# Patient Record
Sex: FEMALE | Race: WHITE | NOT HISPANIC OR LATINO | Employment: OTHER | URBAN - METROPOLITAN AREA
[De-identification: names, ages, dates, MRNs, and addresses within clinical notes are randomized per-mention and may not be internally consistent; named-entity substitution may affect disease eponyms.]

---

## 2017-03-17 ENCOUNTER — RECORDS - HEALTHEAST (OUTPATIENT)
Dept: LAB | Facility: CLINIC | Age: 77
End: 2017-03-17

## 2017-03-17 LAB
CHOLEST SERPL-MCNC: 134 MG/DL
FASTING STATUS PATIENT QL REPORTED: ABNORMAL
HDLC SERPL-MCNC: 43 MG/DL
LDLC SERPL CALC-MCNC: 73 MG/DL
TRIGL SERPL-MCNC: 88 MG/DL

## 2019-04-04 ENCOUNTER — RECORDS - HEALTHEAST (OUTPATIENT)
Dept: LAB | Facility: CLINIC | Age: 79
End: 2019-04-04

## 2019-04-04 LAB
ALBUMIN SERPL-MCNC: 4 G/DL (ref 3.5–5)
ALP SERPL-CCNC: 68 U/L (ref 45–120)
ALT SERPL W P-5'-P-CCNC: 14 U/L (ref 0–45)
ANION GAP SERPL CALCULATED.3IONS-SCNC: 11 MMOL/L (ref 5–18)
AST SERPL W P-5'-P-CCNC: 17 U/L (ref 0–40)
BILIRUB SERPL-MCNC: 0.3 MG/DL (ref 0–1)
BUN SERPL-MCNC: 26 MG/DL (ref 8–28)
CALCIUM SERPL-MCNC: 10.2 MG/DL (ref 8.5–10.5)
CHLORIDE BLD-SCNC: 105 MMOL/L (ref 98–107)
CO2 SERPL-SCNC: 25 MMOL/L (ref 22–31)
CREAT SERPL-MCNC: 1.24 MG/DL (ref 0.6–1.1)
GFR SERPL CREATININE-BSD FRML MDRD: 42 ML/MIN/1.73M2
GLUCOSE BLD-MCNC: 93 MG/DL (ref 70–125)
POTASSIUM BLD-SCNC: 3.9 MMOL/L (ref 3.5–5)
PROT SERPL-MCNC: 6.5 G/DL (ref 6–8)
SODIUM SERPL-SCNC: 141 MMOL/L (ref 136–145)
TSH SERPL DL<=0.005 MIU/L-ACNC: 1.58 UIU/ML (ref 0.3–5)

## 2019-05-09 ENCOUNTER — RECORDS - HEALTHEAST (OUTPATIENT)
Dept: LAB | Facility: CLINIC | Age: 79
End: 2019-05-09

## 2019-05-09 LAB
ANION GAP SERPL CALCULATED.3IONS-SCNC: 9 MMOL/L (ref 5–18)
BUN SERPL-MCNC: 21 MG/DL (ref 8–28)
CALCIUM SERPL-MCNC: 9.9 MG/DL (ref 8.5–10.5)
CHLORIDE BLD-SCNC: 107 MMOL/L (ref 98–107)
CO2 SERPL-SCNC: 25 MMOL/L (ref 22–31)
CREAT SERPL-MCNC: 0.87 MG/DL (ref 0.6–1.1)
GFR SERPL CREATININE-BSD FRML MDRD: >60 ML/MIN/1.73M2
GLUCOSE BLD-MCNC: 88 MG/DL (ref 70–125)
POTASSIUM BLD-SCNC: 4.2 MMOL/L (ref 3.5–5)
SODIUM SERPL-SCNC: 141 MMOL/L (ref 136–145)

## 2020-09-28 ENCOUNTER — RECORDS - HEALTHEAST (OUTPATIENT)
Dept: LAB | Facility: CLINIC | Age: 80
End: 2020-09-28

## 2020-09-28 LAB
ALBUMIN SERPL-MCNC: 3.8 G/DL (ref 3.5–5)
ALP SERPL-CCNC: 78 U/L (ref 45–120)
ALT SERPL W P-5'-P-CCNC: 17 U/L (ref 0–45)
ANION GAP SERPL CALCULATED.3IONS-SCNC: 13 MMOL/L (ref 5–18)
AST SERPL W P-5'-P-CCNC: 23 U/L (ref 0–40)
BILIRUB SERPL-MCNC: 0.3 MG/DL (ref 0–1)
BUN SERPL-MCNC: 20 MG/DL (ref 8–28)
CALCIUM SERPL-MCNC: 9.3 MG/DL (ref 8.5–10.5)
CHLORIDE BLD-SCNC: 105 MMOL/L (ref 98–107)
CO2 SERPL-SCNC: 18 MMOL/L (ref 22–31)
CREAT SERPL-MCNC: 1.01 MG/DL (ref 0.6–1.1)
GFR SERPL CREATININE-BSD FRML MDRD: 53 ML/MIN/1.73M2
GLUCOSE BLD-MCNC: 94 MG/DL (ref 70–125)
POTASSIUM BLD-SCNC: 4.8 MMOL/L (ref 3.5–5)
PROT SERPL-MCNC: 6.9 G/DL (ref 6–8)
SODIUM SERPL-SCNC: 136 MMOL/L (ref 136–145)

## 2020-11-09 ENCOUNTER — RECORDS - HEALTHEAST (OUTPATIENT)
Dept: LAB | Facility: CLINIC | Age: 80
End: 2020-11-09

## 2020-11-09 LAB
APPEARANCE FLD: CLEAR
COLOR FLD: YELLOW
CRYSTALS SNV MICRO: ABNORMAL
LYMPHOCYTES NFR FLD MANUAL: 31 %
MACROPHAGE % - HISTORICAL: 4 %
MONOCYTE % - HISTORICAL: 30 %
NEUTS BAND NFR FLD MANUAL: 36 %
RBC FLUID - HISTORICAL: ABNORMAL /UL
WBC # FLD AUTO: 542 /UL (ref 0–99)

## 2020-11-12 LAB
BACTERIA SPEC CULT: NO GROWTH
GRAM STAIN RESULT: NORMAL
GRAM STAIN RESULT: NORMAL

## 2021-05-29 ENCOUNTER — RECORDS - HEALTHEAST (OUTPATIENT)
Dept: ADMINISTRATIVE | Facility: CLINIC | Age: 81
End: 2021-05-29

## 2021-05-30 ENCOUNTER — RECORDS - HEALTHEAST (OUTPATIENT)
Dept: ADMINISTRATIVE | Facility: CLINIC | Age: 81
End: 2021-05-30

## 2021-06-16 PROBLEM — K21.9 GASTROESOPHAGEAL REFLUX DISEASE: Status: ACTIVE | Noted: 2021-03-23

## 2021-06-16 PROBLEM — H25.9 AGE-RELATED CATARACT: Status: ACTIVE | Noted: 2021-03-23

## 2021-06-16 PROBLEM — I10 BENIGN ESSENTIAL HYPERTENSION: Status: ACTIVE | Noted: 2021-03-23

## 2021-06-16 PROBLEM — M85.80 OSTEOPENIA: Status: ACTIVE | Noted: 2021-03-23

## 2021-06-16 PROBLEM — K76.9 DISEASE OF LIVER: Status: ACTIVE | Noted: 2021-03-23

## 2021-07-21 ENCOUNTER — RECORDS - HEALTHEAST (OUTPATIENT)
Dept: ADMINISTRATIVE | Facility: CLINIC | Age: 81
End: 2021-07-21

## 2022-02-17 ENCOUNTER — LAB REQUISITION (OUTPATIENT)
Dept: LAB | Facility: CLINIC | Age: 82
End: 2022-02-17
Payer: MEDICARE

## 2022-02-17 DIAGNOSIS — I10 ESSENTIAL (PRIMARY) HYPERTENSION: ICD-10-CM

## 2022-02-17 LAB
ANION GAP SERPL CALCULATED.3IONS-SCNC: 9 MMOL/L (ref 5–18)
BUN SERPL-MCNC: 25 MG/DL (ref 8–28)
CALCIUM SERPL-MCNC: 9.4 MG/DL (ref 8.5–10.5)
CHLORIDE BLD-SCNC: 106 MMOL/L (ref 98–107)
CHOLEST SERPL-MCNC: 182 MG/DL
CO2 SERPL-SCNC: 24 MMOL/L (ref 22–31)
CREAT SERPL-MCNC: 1.08 MG/DL (ref 0.6–1.1)
FASTING STATUS PATIENT QL REPORTED: NORMAL
GFR SERPL CREATININE-BSD FRML MDRD: 51 ML/MIN/1.73M2
GLUCOSE BLD-MCNC: 89 MG/DL (ref 70–125)
HDLC SERPL-MCNC: 56 MG/DL
LDLC SERPL CALC-MCNC: 110 MG/DL
POTASSIUM BLD-SCNC: 3.8 MMOL/L (ref 3.5–5)
SODIUM SERPL-SCNC: 139 MMOL/L (ref 136–145)
TRIGL SERPL-MCNC: 79 MG/DL

## 2022-02-17 PROCEDURE — 80061 LIPID PANEL: CPT | Mod: ORL | Performed by: NURSE PRACTITIONER

## 2022-02-17 PROCEDURE — 80048 BASIC METABOLIC PNL TOTAL CA: CPT | Mod: ORL | Performed by: NURSE PRACTITIONER

## 2023-04-24 ENCOUNTER — LAB REQUISITION (OUTPATIENT)
Dept: LAB | Facility: CLINIC | Age: 83
End: 2023-04-24
Payer: MEDICARE

## 2023-04-24 DIAGNOSIS — I10 ESSENTIAL (PRIMARY) HYPERTENSION: ICD-10-CM

## 2023-04-24 LAB
ANION GAP SERPL CALCULATED.3IONS-SCNC: 14 MMOL/L (ref 7–15)
BUN SERPL-MCNC: 30.8 MG/DL (ref 8–23)
CALCIUM SERPL-MCNC: 9.4 MG/DL (ref 8.8–10.2)
CHLORIDE SERPL-SCNC: 101 MMOL/L (ref 98–107)
CREAT SERPL-MCNC: 1.23 MG/DL (ref 0.51–0.95)
DEPRECATED HCO3 PLAS-SCNC: 22 MMOL/L (ref 22–29)
GFR SERPL CREATININE-BSD FRML MDRD: 43 ML/MIN/1.73M2
GLUCOSE SERPL-MCNC: 75 MG/DL (ref 70–99)
POTASSIUM SERPL-SCNC: 3.9 MMOL/L (ref 3.4–5.3)
SODIUM SERPL-SCNC: 137 MMOL/L (ref 136–145)

## 2023-04-24 PROCEDURE — 80048 BASIC METABOLIC PNL TOTAL CA: CPT | Mod: ORL | Performed by: NURSE PRACTITIONER

## 2023-05-14 ENCOUNTER — HOSPITAL ENCOUNTER (EMERGENCY)
Facility: CLINIC | Age: 83
Discharge: HOME | End: 2023-05-15
Attending: EMERGENCY MEDICINE | Admitting: EMERGENCY MEDICINE
Payer: MEDICARE

## 2023-05-14 DIAGNOSIS — I67.1 ANEURYSM OF CAVERNOUS PORTION OF LEFT INTERNAL CAROTID ARTERY: ICD-10-CM

## 2023-05-14 DIAGNOSIS — H81.12 BENIGN PAROXYSMAL POSITIONAL VERTIGO, LEFT: ICD-10-CM

## 2023-05-14 LAB
ALBUMIN SERPL-MCNC: 3.4 G/DL (ref 3.5–5)
ALP SERPL-CCNC: 69 U/L (ref 45–120)
ALT SERPL W P-5'-P-CCNC: 11 U/L (ref 0–45)
ANION GAP SERPL CALCULATED.3IONS-SCNC: 8 MMOL/L (ref 5–18)
APTT PPP: 34 SECONDS (ref 22–38)
AST SERPL W P-5'-P-CCNC: 18 U/L (ref 0–40)
ATRIAL RATE - MUSE: 73 BPM
BASOPHILS # BLD AUTO: 0 10E3/UL (ref 0–0.2)
BASOPHILS NFR BLD AUTO: 1 %
BILIRUB SERPL-MCNC: 0.3 MG/DL (ref 0–1)
BUN SERPL-MCNC: 20 MG/DL (ref 8–28)
CALCIUM SERPL-MCNC: 8.9 MG/DL (ref 8.5–10.5)
CHLORIDE BLD-SCNC: 106 MMOL/L (ref 98–107)
CO2 SERPL-SCNC: 21 MMOL/L (ref 22–31)
CREAT SERPL-MCNC: 0.97 MG/DL (ref 0.6–1.1)
DIASTOLIC BLOOD PRESSURE - MUSE: NORMAL MMHG
EOSINOPHIL # BLD AUTO: 0.2 10E3/UL (ref 0–0.7)
EOSINOPHIL NFR BLD AUTO: 3 %
ERYTHROCYTE [DISTWIDTH] IN BLOOD BY AUTOMATED COUNT: 14.8 % (ref 10–15)
GFR SERPL CREATININE-BSD FRML MDRD: 58 ML/MIN/1.73M2
GLUCOSE BLD-MCNC: 124 MG/DL (ref 70–125)
HCT VFR BLD AUTO: 29.7 % (ref 35–47)
HGB BLD-MCNC: 9.6 G/DL (ref 11.7–15.7)
IMM GRANULOCYTES # BLD: 0 10E3/UL
IMM GRANULOCYTES NFR BLD: 0 %
INR PPP: 1.01 (ref 0.85–1.15)
INTERPRETATION ECG - MUSE: NORMAL
LYMPHOCYTES # BLD AUTO: 1.6 10E3/UL (ref 0.8–5.3)
LYMPHOCYTES NFR BLD AUTO: 21 %
MAGNESIUM SERPL-MCNC: 1.7 MG/DL (ref 1.8–2.6)
MCH RBC QN AUTO: 27 PG (ref 26.5–33)
MCHC RBC AUTO-ENTMCNC: 32.3 G/DL (ref 31.5–36.5)
MCV RBC AUTO: 84 FL (ref 78–100)
MONOCYTES # BLD AUTO: 0.6 10E3/UL (ref 0–1.3)
MONOCYTES NFR BLD AUTO: 8 %
NEUTROPHILS # BLD AUTO: 4.9 10E3/UL (ref 1.6–8.3)
NEUTROPHILS NFR BLD AUTO: 67 %
NRBC # BLD AUTO: 0 10E3/UL
NRBC BLD AUTO-RTO: 0 /100
P AXIS - MUSE: 28 DEGREES
PLATELET # BLD AUTO: 331 10E3/UL (ref 150–450)
POTASSIUM BLD-SCNC: 3.7 MMOL/L (ref 3.5–5)
PR INTERVAL - MUSE: 214 MS
PROT SERPL-MCNC: 6.2 G/DL (ref 6–8)
QRS DURATION - MUSE: 72 MS
QT - MUSE: 370 MS
QTC - MUSE: 407 MS
R AXIS - MUSE: 22 DEGREES
RBC # BLD AUTO: 3.55 10E6/UL (ref 3.8–5.2)
SODIUM SERPL-SCNC: 135 MMOL/L (ref 136–145)
SYSTOLIC BLOOD PRESSURE - MUSE: NORMAL MMHG
T AXIS - MUSE: 9 DEGREES
TROPONIN I SERPL-MCNC: 0.01 NG/ML (ref 0–0.29)
VENTRICULAR RATE- MUSE: 73 BPM
WBC # BLD AUTO: 7.4 10E3/UL (ref 4–11)

## 2023-05-14 PROCEDURE — 36415 COLL VENOUS BLD VENIPUNCTURE: CPT | Performed by: EMERGENCY MEDICINE

## 2023-05-14 PROCEDURE — 93005 ELECTROCARDIOGRAM TRACING: CPT | Performed by: EMERGENCY MEDICINE

## 2023-05-14 PROCEDURE — 96374 THER/PROPH/DIAG INJ IV PUSH: CPT

## 2023-05-14 PROCEDURE — 84484 ASSAY OF TROPONIN QUANT: CPT | Performed by: EMERGENCY MEDICINE

## 2023-05-14 PROCEDURE — 83735 ASSAY OF MAGNESIUM: CPT | Performed by: EMERGENCY MEDICINE

## 2023-05-14 PROCEDURE — 80053 COMPREHEN METABOLIC PANEL: CPT | Performed by: EMERGENCY MEDICINE

## 2023-05-14 PROCEDURE — 250N000011 HC RX IP 250 OP 636: Performed by: EMERGENCY MEDICINE

## 2023-05-14 PROCEDURE — 85730 THROMBOPLASTIN TIME PARTIAL: CPT | Performed by: EMERGENCY MEDICINE

## 2023-05-14 PROCEDURE — 99285 EMERGENCY DEPT VISIT HI MDM: CPT | Mod: 25

## 2023-05-14 PROCEDURE — 85610 PROTHROMBIN TIME: CPT | Performed by: EMERGENCY MEDICINE

## 2023-05-14 PROCEDURE — 85025 COMPLETE CBC W/AUTO DIFF WBC: CPT | Performed by: EMERGENCY MEDICINE

## 2023-05-14 RX ORDER — MECLIZINE HCL 12.5 MG 12.5 MG/1
12.5 TABLET ORAL ONCE
Status: COMPLETED | OUTPATIENT
Start: 2023-05-14 | End: 2023-05-15

## 2023-05-14 RX ORDER — ONDANSETRON 2 MG/ML
4 INJECTION INTRAMUSCULAR; INTRAVENOUS EVERY 30 MIN PRN
Status: DISCONTINUED | OUTPATIENT
Start: 2023-05-14 | End: 2023-05-15 | Stop reason: HOSPADM

## 2023-05-14 RX ORDER — MAGNESIUM OXIDE 400 MG/1
400 TABLET ORAL ONCE
Status: COMPLETED | OUTPATIENT
Start: 2023-05-15 | End: 2023-05-15

## 2023-05-14 RX ADMIN — ONDANSETRON 4 MG: 2 INJECTION INTRAMUSCULAR; INTRAVENOUS at 23:06

## 2023-05-14 ASSESSMENT — ENCOUNTER SYMPTOMS
NAUSEA: 1
DIZZINESS: 1
EYES NEGATIVE: 1
HEADACHES: 1
PALPITATIONS: 1

## 2023-05-14 ASSESSMENT — ACTIVITIES OF DAILY LIVING (ADL): ADLS_ACUITY_SCORE: 33

## 2023-05-15 ENCOUNTER — APPOINTMENT (OUTPATIENT)
Dept: MRI IMAGING | Facility: CLINIC | Age: 83
End: 2023-05-15
Attending: EMERGENCY MEDICINE
Payer: MEDICARE

## 2023-05-15 VITALS
TEMPERATURE: 97.7 F | HEART RATE: 75 BPM | DIASTOLIC BLOOD PRESSURE: 78 MMHG | BODY MASS INDEX: 24.75 KG/M2 | OXYGEN SATURATION: 98 % | RESPIRATION RATE: 16 BRPM | HEIGHT: 64 IN | WEIGHT: 145 LBS | SYSTOLIC BLOOD PRESSURE: 138 MMHG

## 2023-05-15 PROCEDURE — 70549 MR ANGIOGRAPH NECK W/O&W/DYE: CPT | Mod: MA

## 2023-05-15 PROCEDURE — A9585 GADOBUTROL INJECTION: HCPCS | Performed by: EMERGENCY MEDICINE

## 2023-05-15 PROCEDURE — 255N000002 HC RX 255 OP 636: Performed by: EMERGENCY MEDICINE

## 2023-05-15 PROCEDURE — 70544 MR ANGIOGRAPHY HEAD W/O DYE: CPT | Mod: MA

## 2023-05-15 PROCEDURE — 70553 MRI BRAIN STEM W/O & W/DYE: CPT | Mod: MA

## 2023-05-15 PROCEDURE — 250N000013 HC RX MED GY IP 250 OP 250 PS 637: Performed by: EMERGENCY MEDICINE

## 2023-05-15 RX ORDER — GADOBUTROL 604.72 MG/ML
10 INJECTION INTRAVENOUS ONCE
Status: COMPLETED | OUTPATIENT
Start: 2023-05-15 | End: 2023-05-15

## 2023-05-15 RX ORDER — MECLIZINE HYDROCHLORIDE 25 MG/1
12.5 TABLET ORAL 3 TIMES DAILY PRN
Qty: 12 TABLET | Refills: 0 | Status: SHIPPED | OUTPATIENT
Start: 2023-05-15

## 2023-05-15 RX ADMIN — Medication 400 MG: at 01:49

## 2023-05-15 RX ADMIN — GADOBUTROL 10 ML: 604.72 INJECTION INTRAVENOUS at 00:47

## 2023-05-15 ASSESSMENT — VISUAL ACUITY: OU: 1

## 2023-05-15 ASSESSMENT — ACTIVITIES OF DAILY LIVING (ADL): ADLS_ACUITY_SCORE: 35

## 2023-05-15 NOTE — DISCHARGE INSTRUCTIONS
As discussed in the ER recommend meclizine as needed for the vertigo or spinning feeling if it returns.  Recommend monitoring her blood pressure at home and continuing to take your regular blood pressure medications.  Recommend follow-up with neurosurgery to monitor incidental finding of aneurysm on your MRI.

## 2023-05-15 NOTE — ED PROVIDER NOTES
NAME: Carmen Rowe  AGE: 82 year old female  YOB: 1940  MRN: [id number]  EVALUATION DATE & TIME: No admission date for patient encounter.    PCP: System, Provider Not In    ED PROVIDER: Lorne Carlisle M.D.      Chief Complaint   Patient presents with     Dizziness     FINAL IMPRESSION:  1. 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 Benign paroxysmal positional vertigo, left        MEDICAL DECISION MAKIN:57 PM I met with the patient, obtained history, performed an initial exam, and discussed options and plan for diagnostics and treatment here in the ED.   Patient was clinically assessed and consented to treatment. After assessment, medical decision making and workup were discussed with the patient. The patient was agreeable to plan for testing, workup, and treatment.  Pertinent Labs & Imaging studies reviewed. (See chart for details)       Medical Decision Making    History:    Supplemental history from: Documented in chart, if applicable    External Record(s) reviewed: Documented in chart, if applicable.    Work Up:    Chart documentation includes differential considered and any EKGs or imaging independently interpreted by provider, where specified.    In additional to work up documented, I considered the following work up: Documented in chart, if applicable.    External consultation:    Discussion of management with another provider: Documented in chart, if applicable    Complicating factors:    Care impacted by chronic illness: N/A    Care affected by social determinants of health: N/A    Disposition considerations: Discharge. I prescribed additional prescription strength medication(s) as charted. See documentation for any additional details.    Carmen Rowe is a 82 year old female who presents with vertigo.   Differential diagnosis includes but not limited to benign positional vertigo, central vertigo, cerebellar infarct, TIA, vertebral  dissection.  Patient with intermittent episodes of vertigo since earlier this evening.  Patient's symptoms are gone now and would be concerning for possible TIA though with the positional nature I would feel more likely benign positional vertigo.  Patient otherwise neurologically intact at this time with no acute findings and only notable for elevated blood pressure.  Patient does appear anxious so and will plan for recheck of blood pressure.  Patient was seen in triage due to boarding and will start work-up there.  Labs ordered, EKG, and MRI to evaluate posterior circulation.  Labs showed unremarkable CBC, stable metabolic panel, negative troponin, and EKG was unremarkable for any acute ischemic changes.  Patient's recheck blood pressure in the 140s and will continue monitoring and waiting for MRI.  Patient not without any symptoms but was given some Zofran and meclizine to prevent return of any vertigo with changes in position and getting her into bed once a room was available in the ER.  Patient then sent to MRI and MRI returned showing no signs of acute infarct but did show a incidental finding of a 6 x 7 mm left ICA aneurysm.  This unlikely is causing any of her vertigo as symptoms are now completely gone and most likely these are benign positional vertigo but incidental finding of the aneurysm needs follow-up.  I did speak with her about this and the risk factors for 5-year rupture and concern for blood pressure control.  She does take blood pressure medications and reports her blood pressure has been running a little bit low but her doctor is allowing this as she is asymptomatic from it.  Blood pressures on recheck here were in the 140s under and after discussion of blood pressure control with patient as well as risk factors and recommendations with the aneurysm I will let her discharged to follow-up with her primary doctor for the vertigo as well as neurosurgery for monitoring of the aneurysm.  Patient  "comfortable with plan and ready to go home will be plan for discharge.    0 minutes of critical care time    MEDICATIONS GIVEN IN THE EMERGENCY:  Medications   ondansetron (ZOFRAN) injection 4 mg (4 mg Intravenous $Given 5/14/23 4116)   meclizine (ANTIVERT) tablet 12.5 mg (12.5 mg Oral Not Given 5/15/23 0239)   magnesium oxide (MAG-OX) tablet 400 mg (400 mg Oral $Given 5/15/23 0149)   gadobutrol (GADAVIST) injection 10 mL (10 mLs Intravenous $Given 5/15/23 0047)       NEW PRESCRIPTIONS STARTED AT TODAY'S ER VISIT:  Discharge Medication List as of 5/15/2023  2:12 AM      START taking these medications    Details   meclizine (ANTIVERT) 25 MG tablet Take 0.5 tablets (12.5 mg) by mouth 3 times daily as needed for dizziness or nausea, Disp-12 tablet, R-0, Local Print                =================================================================    HPI    Patient information was obtained from: Patient    Use of : N/A       Carmen Rowe is a 82 year old female with a past medical history of hypertension, liver disease, GERD, and osteopenia, who presents to the ED by private car with a concern of dizziness.    PMHx: hypertension (~last took medication this morning)    Patient reports an onset of dizziness which occurred at 7:00 PM tonight. She notes going into the kitchen and \"couldn't walk\" suddenly. She states she immediately went to lay down and \"everything went crazy\" afterwards. Patient endorses heart palpitations which she described as \"my heart was racing\". She notes it went away. She states she is currently \"ok\", but now endorses mild nausea and headache upon arrival to the ED.     Patient denies fall, head trauma, and vision changes. No other medical concerns are expressed at this time.     REVIEW OF SYSTEMS   Review of Systems   Eyes: Negative.    Cardiovascular: Positive for palpitations (intermittent, occurs after dizziness).   Gastrointestinal: Positive for nausea (mild).   Neurological: " "Positive for dizziness and headaches (mild).   All other systems reviewed and are negative.     PAST MEDICAL HISTORY:  History reviewed. No pertinent past medical history.    PAST SURGICAL HISTORY:  History reviewed. No pertinent surgical history.    CURRENT MEDICATIONS:      Current Facility-Administered Medications:      ondansetron (ZOFRAN) injection 4 mg, 4 mg, Intravenous, Q30 Min PRN, Lorne Carlisle MD, 4 mg at 05/14/23 7504    Current Outpatient Medications:      meclizine (ANTIVERT) 25 MG tablet, Take 0.5 tablets (12.5 mg) by mouth 3 times daily as needed for dizziness or nausea, Disp: 12 tablet, Rfl: 0     aspirin 81 mg chewable tablet, [ASPIRIN 81 MG CHEWABLE TABLET] Chew 81 mg daily., Disp: , Rfl:      lisinopriL-hydrochlorothiazide (PRINZIDE,ZESTORETIC) 10-12.5 mg per tablet, [LISINOPRIL-HYDROCHLOROTHIAZIDE (PRINZIDE,ZESTORETIC) 10-12.5 MG PER TABLET] Take 1 tablet by mouth daily., Disp: , Rfl:     ALLERGIES:  Allergies   Allergen Reactions     Guaifenesin-Codeine Unknown     Other reaction(s): mouth swelling     Hydrocodone-Acetaminophen Unknown     Other reaction(s): nausea and vomiting       FAMILY HISTORY:  History reviewed. No pertinent family history.    SOCIAL HISTORY:   Social History     Socioeconomic History     Marital status:      PHYSICAL EXAM:    Vitals: /78   Pulse 75   Temp 97.7  F (36.5  C) (Temporal)   Resp 16   Ht 1.626 m (5' 4\")   Wt 65.8 kg (145 lb)   SpO2 98%   BMI 24.89 kg/m     Physical Exam  Vitals and nursing note reviewed.   Constitutional:       General: She is not in acute distress.     Appearance: Normal appearance. She is normal weight. She is not ill-appearing or toxic-appearing.   HENT:      Head: Normocephalic and atraumatic.   Eyes:      General: Vision grossly intact.      Extraocular Movements: Extraocular movements intact.      Right eye: Normal extraocular motion and no nystagmus.      Left eye: Normal extraocular motion and no " nystagmus.      Pupils: Pupils are equal, round, and reactive to light.   Cardiovascular:      Rate and Rhythm: Normal rate and regular rhythm.      Heart sounds: Normal heart sounds.   Pulmonary:      Effort: Pulmonary effort is normal. No respiratory distress.      Breath sounds: Normal breath sounds.   Musculoskeletal:         General: No signs of injury.      Cervical back: Normal range of motion and neck supple. No tenderness.   Skin:     General: Skin is warm and dry.      Coloration: Skin is not pale.   Neurological:      General: No focal deficit present.      Mental Status: She is alert and oriented to person, place, and time.      Cranial Nerves: No cranial nerve deficit.      Sensory: No sensory deficit.      Motor: No weakness.      Coordination: Coordination normal.      Gait: Gait normal.   Psychiatric:         Behavior: Behavior normal.           LAB:  All pertinent labs reviewed and interpreted.  Labs Ordered and Resulted from Time of ED Arrival to Time of ED Departure   COMPREHENSIVE METABOLIC PANEL - Abnormal       Result Value    Sodium 135 (*)     Potassium 3.7      Chloride 106      Carbon Dioxide (CO2) 21 (*)     Anion Gap 8      Urea Nitrogen 20      Creatinine 0.97      Calcium 8.9      Glucose 124      Alkaline Phosphatase 69      AST 18      ALT 11      Protein Total 6.2      Albumin 3.4 (*)     Bilirubin Total 0.3      GFR Estimate 58 (*)    MAGNESIUM - Abnormal    Magnesium 1.7 (*)    CBC WITH PLATELETS AND DIFFERENTIAL - Abnormal    WBC Count 7.4      RBC Count 3.55 (*)     Hemoglobin 9.6 (*)     Hematocrit 29.7 (*)     MCV 84      MCH 27.0      MCHC 32.3      RDW 14.8      Platelet Count 331      % Neutrophils 67      % Lymphocytes 21      % Monocytes 8      % Eosinophils 3      % Basophils 1      % Immature Granulocytes 0      NRBCs per 100 WBC 0      Absolute Neutrophils 4.9      Absolute Lymphocytes 1.6      Absolute Monocytes 0.6      Absolute Eosinophils 0.2      Absolute Basophils  0.0      Absolute Immature Granulocytes 0.0      Absolute NRBCs 0.0     INR - Normal    INR 1.01     PARTIAL THROMBOPLASTIN TIME - Normal    aPTT 34     TROPONIN I - Normal    Troponin I 0.01         RADIOLOGY:  MRA Neck (Carotids) wo & w Contrast   Final Result   IMPRESSION:   HEAD MRI:   No acute findings. Mild age-related changes.      HEAD MRA:   1.  7 x 6 mm laterally directed aneurysm off the mid cavernous left ICA.   2.  No branch occlusion, significant flow-limiting stenosis or AVM.      NECK MRA:   No measurable stenosis or dissection.         MRA Brain ([place]) wo Contrast   Final Result   IMPRESSION:   HEAD MRI:   No acute findings. Mild age-related changes.      HEAD MRA:   1.  7 x 6 mm laterally directed aneurysm off the mid cavernous left ICA.   2.  No branch occlusion, significant flow-limiting stenosis or AVM.      NECK MRA:   No measurable stenosis or dissection.         MR Brain w/o & w Contrast   Final Result   IMPRESSION:   HEAD MRI:   No acute findings. Mild age-related changes.      HEAD MRA:   1.  7 x 6 mm laterally directed aneurysm off the mid cavernous left ICA.   2.  No branch occlusion, significant flow-limiting stenosis or AVM.      NECK MRA:   No measurable stenosis or dissection.           EKG:   Performed at: 9:32 PM on May 14, 2023.  Impression: Sinus rhythm with first-degree AV block, no signs of acute ST elevation ischemia, no irregular rhythm or atrial fibrillation.  Rate: 73 bpm  Rhythm: Sinus rhythm with first-degree AV block.  QRS Interval: 72 ms  QTc Interval: 407 ms  Comparison: Comparison prior EKG from March 23, 2021 with no acute morphology changes.  I have independently reviewed and interpreted the EKG(s) documented above.     PROCEDURES:   Procedures     I, Gabe Thompson, am serving as a scribe to document services personally performed by Dr. Lorne Carlisle  based on my observation and the provider's statements to me. I, Lorne Carlisle MD attest that  Gabe Thompson is acting in a scribe capacity, has observed my performance of the services and has documented them in accordance with my direction.    Lorne Carlisle M.D.  Emergency Medicine  [facility] Emergency Department     Lorne Carlisle MD  05/15/23 0329

## 2023-05-15 NOTE — ED TRIAGE NOTES
Here for dizziness that describes as room spinning that started a few hours ago. Also reported feeling some palpitations earlier with no chest pain that have now resolved. Denies shortness of breath.   Mild swelling to legs noted. Passed abbreviated NIHS scale.     Triage Assessment     Row Name 05/14/23 3405       Triage Assessment (Adult)    Airway WDL WDL       Respiratory WDL    Respiratory WDL X;rhythm/pattern    Rhythm/Pattern, Respiratory unlabored;pattern regular;deep       Skin Circulation/Temperature WDL    Skin Circulation/Temperature WDL WDL       Cardiac WDL    Cardiac WDL X  palpitations earlier that have now resolved, denies pain       Peripheral/Neurovascular WDL    Peripheral Neurovascular WDL WDL       Cognitive/Neuro/Behavioral WDL    Cognitive/Neuro/Behavioral WDL X    Level of Consciousness alert    Arousal Level opens eyes spontaneously    Orientation oriented x 4    Speech clear;spontaneous;logical    Mood/Behavior cooperative;calm       [place] Coma Scale    Best Eye Response 4-->(E4) spontaneous    Best Motor Response 6-->(M6) obeys commands    Best Verbal Response 5-->(V5) oriented    [place] Coma Scale Score 15

## 2023-05-17 ENCOUNTER — TELEPHONE (OUTPATIENT)
Dept: NEUROSURGERY | Facility: CLINIC | Age: 83
End: 2023-05-17
Payer: MEDICARE

## 2023-05-17 NOTE — TELEPHONE ENCOUNTER
Health Call Center    Phone Message    May a detailed message be left on voicemail: yes     Reason for Call: Other: Patient is calling requesting to schedule a new patient appointment. She states that the ER provider should have sent over all of the information needed for an appointment. Please review and call patient back to schedule.     Action Taken: Message routed to:  [facility] ([facility]    Travel Screening: Not Applicable

## 2023-06-19 NOTE — TELEPHONE ENCOUNTER
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

## 2023-07-11 ENCOUNTER — PRE VISIT (OUTPATIENT)
Dept: NEUROSURGERY | Facility: CLINIC | Age: 83
End: 2023-07-11

## 2023-07-11 ENCOUNTER — OFFICE VISIT (OUTPATIENT)
Dept: NEUROSURGERY | Facility: CLINIC | Age: 83
End: 2023-07-11
Attending: RADIOLOGY
Payer: MEDICARE

## 2023-07-11 VITALS
HEIGHT: 64 IN | WEIGHT: 145 LBS | SYSTOLIC BLOOD PRESSURE: 160 MMHG | BODY MASS INDEX: 24.75 KG/M2 | HEART RATE: 70 BPM | OXYGEN SATURATION: 95 % | DIASTOLIC BLOOD PRESSURE: 80 MMHG

## 2023-07-11 DIAGNOSIS — I67.1 CEREBRAL ANEURYSM, NONRUPTURED: Primary | ICD-10-CM

## 2023-07-11 PROCEDURE — 99207 PR NO BILLABLE SERVICE THIS VISIT: CPT | Performed by: RADIOLOGY

## 2023-07-11 ASSESSMENT — PAIN SCALES - GENERAL: PAINLEVEL: NO PAIN (0)

## 2023-07-11 NOTE — LETTER
7/11/2023       RE: Carmen Rowe  [address]  [place] [place] [address]     Dear Colleague,    Thank you for referring your patient, Carmen Rowe, to the [facility] [place] [facility]. Please see a copy of my visit note below.    Neuroendovascular surgery note    Ms. Carmen Rowe is a 82-year-old female with no significant past medical history presenting with MRA a 6 x 7 mm left internal carotid artery cavernous segment aneurysm.  This was incidentally discovered when patient presented to ED in May 2023 with episodes of vertigo.  She underwent MRI MRA to rule out posterior circulation stroke.  MRI brain was negative for acute stroke, but the MRA demonstrated left internal carotid artery mid cavernous segment aneurysm measuring 7 x 6 mm.  Patient does not have any family history of subarachnoid hemorrhage or aneurysm, she does not have any personal history of aneurysm or subarachnoid hemorrhage.  She does have history of hypertension that is relatively well controlled on oral medications.  She is a non-smoker, nondrinker.  In clinic today, we discussed the natural history of cavernous segment aneurysms and the low risk of it processes for rupture.  All questions and concerns were addressed.  Patient otherwise reports to be doing well.  Her vertigo has significantly reduced since being started on meclizine.  Neurological examination in the clinic was unremarkable.    Assessment and plan  82-year-old female with an incidentally found left internal carotid artery cavernous segment aneurysm measuring 6 x 7 mm.  This can be monitored with serial imaging in 1 year.  Patient does not have any ocular/ophthalmic symptoms related to this lesion.  Cavernous segment aneurysms also generally have negligible risk of rupture or subarachnoid hemorrhage.    Plan  -MRA head in 1 year  -Return to clinic in 1 year    Case was  discussed with Dr. Stuart.  Approximately 30 minutes were spent during this clinic visit including coordination of care, chart review and documentation.        Attestation signed by Domingo Stuart MD at 7/19/2023 10:48 AM:  I agree with the above note by Dr. Pearson       on  7/11/23        Again, thank you for allowing me to participate in the care of your patient.      Sincerely,    Domingo Stuart MD

## 2023-07-11 NOTE — NURSING NOTE
Chief Complaint   Patient presents with     Consult     Bakari Jonas CMA at 9:13 AM on 7/11/2023.

## 2023-07-11 NOTE — PATIENT INSTRUCTIONS
Follow-up with Dr. Stuart in 1 year with an MRA prior to your appointment.    Stroke & Endovascular RN Care Coordinators:    Jeni Noe, RN, BSN  Linda Hunter, RN, CNRN, SCRN    If you have any questions please contact the RN Care Coordinators at [phone], option 1.     After business hours call the  at [phone] and have the Neuro-Interventional Fellow paged.    Thank you for choosing [facility] for your health care needs.

## 2023-07-11 NOTE — PROGRESS NOTES
Neuroendovascular surgery note    Ms. Carmen Rowe is a 82-year-old female with no significant past medical history presenting with MRA a 6 x 7 mm left internal carotid artery cavernous segment aneurysm.  This was incidentally discovered when patient presented to ED in May 2023 with episodes of vertigo.  She underwent MRI MRA to rule out posterior circulation stroke.  MRI brain was negative for acute stroke, but the MRA demonstrated left internal carotid artery mid cavernous segment aneurysm measuring 7 x 6 mm.  Patient does not have any family history of subarachnoid hemorrhage or aneurysm, she does not have any personal history of aneurysm or subarachnoid hemorrhage.  She does have history of hypertension that is relatively well controlled on oral medications.  She is a non-smoker, nondrinker.  In clinic today, we discussed the natural history of cavernous segment aneurysms and the low risk of it processes for rupture.  All questions and concerns were addressed.  Patient otherwise reports to be doing well.  Her vertigo has significantly reduced since being started on meclizine.  Neurological examination in the clinic was unremarkable.    Assessment and plan  82-year-old female with an incidentally found left internal carotid artery cavernous segment aneurysm measuring 6 x 7 mm.  This can be monitored with serial imaging in 1 year.  Patient does not have any ocular/ophthalmic symptoms related to this lesion.  Cavernous segment aneurysms also generally have negligible risk of rupture or subarachnoid hemorrhage.    Plan  -MRA head in 1 year  -Return to clinic in 1 year    Case was discussed with Dr. Stuart.  Approximately 30 minutes were spent during this clinic visit including coordination of care, chart review and documentation.    Manuel Pearson MD, MPH  Neuroendovascular Surgery Fellow  [facility]  Pager: [phone]

## 2023-10-24 ENCOUNTER — HOSPITAL ENCOUNTER (OUTPATIENT)
Facility: CLINIC | Age: 83
End: 2023-10-24
Attending: SURGERY | Admitting: SURGERY
Payer: MEDICARE

## 2023-10-24 ENCOUNTER — ANESTHESIA EVENT (OUTPATIENT)
Dept: SURGERY | Facility: CLINIC | Age: 83
End: 2023-10-24
Payer: MEDICARE

## 2023-10-24 ENCOUNTER — APPOINTMENT (OUTPATIENT)
Dept: CT IMAGING | Facility: CLINIC | Age: 83
End: 2023-10-24
Attending: EMERGENCY MEDICINE
Payer: MEDICARE

## 2023-10-24 ENCOUNTER — HOSPITAL ENCOUNTER (OUTPATIENT)
Facility: CLINIC | Age: 83
Setting detail: OBSERVATION
Discharge: HOME | End: 2023-10-26
Attending: EMERGENCY MEDICINE
Payer: MEDICARE

## 2023-10-24 DIAGNOSIS — K80.50 BILIARY COLIC: ICD-10-CM

## 2023-10-24 DIAGNOSIS — K80.20 SYMPTOMATIC CHOLELITHIASIS: Primary | ICD-10-CM

## 2023-10-24 PROBLEM — I67.1 BRAIN ANEURYSM: Status: ACTIVE | Noted: 2023-10-24

## 2023-10-24 LAB
ALBUMIN SERPL BCG-MCNC: 4.4 G/DL (ref 3.5–5.2)
ALP SERPL-CCNC: 86 U/L (ref 35–104)
ALT SERPL W P-5'-P-CCNC: 12 U/L (ref 0–50)
ANION GAP SERPL CALCULATED.3IONS-SCNC: 11 MMOL/L (ref 7–15)
AST SERPL W P-5'-P-CCNC: 19 U/L (ref 0–45)
ATRIAL RATE - MUSE: 63 BPM
BASOPHILS # BLD AUTO: 0 10E3/UL (ref 0–0.2)
BASOPHILS NFR BLD AUTO: 0 %
BILIRUB DIRECT SERPL-MCNC: <0.2 MG/DL (ref 0–0.3)
BILIRUB SERPL-MCNC: 0.3 MG/DL
BUN SERPL-MCNC: 22 MG/DL (ref 8–23)
CALCIUM SERPL-MCNC: 9.5 MG/DL (ref 8.8–10.2)
CHLORIDE SERPL-SCNC: 104 MMOL/L (ref 98–107)
CREAT SERPL-MCNC: 0.95 MG/DL (ref 0.51–0.95)
DEPRECATED HCO3 PLAS-SCNC: 23 MMOL/L (ref 22–29)
DIASTOLIC BLOOD PRESSURE - MUSE: 89 MMHG
EGFRCR SERPLBLD CKD-EPI 2021: 59 ML/MIN/1.73M2
EOSINOPHIL # BLD AUTO: 0.1 10E3/UL (ref 0–0.7)
EOSINOPHIL NFR BLD AUTO: 1 %
ERYTHROCYTE [DISTWIDTH] IN BLOOD BY AUTOMATED COUNT: 15.1 % (ref 10–15)
ERYTHROCYTE [SEDIMENTATION RATE] IN BLOOD BY WESTERGREN METHOD: 16 MM/HR (ref 0–30)
GLUCOSE SERPL-MCNC: 122 MG/DL (ref 70–99)
HCT VFR BLD AUTO: 35.1 % (ref 35–47)
HGB BLD-MCNC: 10.9 G/DL (ref 11.7–15.7)
IMM GRANULOCYTES # BLD: 0 10E3/UL
IMM GRANULOCYTES NFR BLD: 0 %
INTERPRETATION ECG - MUSE: NORMAL
LIPASE SERPL-CCNC: 38 U/L (ref 13–60)
LYMPHOCYTES # BLD AUTO: 1.3 10E3/UL (ref 0.8–5.3)
LYMPHOCYTES NFR BLD AUTO: 15 %
MCH RBC QN AUTO: 26.2 PG (ref 26.5–33)
MCHC RBC AUTO-ENTMCNC: 31.1 G/DL (ref 31.5–36.5)
MCV RBC AUTO: 84 FL (ref 78–100)
MONOCYTES # BLD AUTO: 0.4 10E3/UL (ref 0–1.3)
MONOCYTES NFR BLD AUTO: 4 %
NEUTROPHILS # BLD AUTO: 7.1 10E3/UL (ref 1.6–8.3)
NEUTROPHILS NFR BLD AUTO: 80 %
NRBC # BLD AUTO: 0 10E3/UL
NRBC BLD AUTO-RTO: 0 /100
P AXIS - MUSE: 18 DEGREES
PLATELET # BLD AUTO: 368 10E3/UL (ref 150–450)
POTASSIUM SERPL-SCNC: 3.9 MMOL/L (ref 3.4–5.3)
PR INTERVAL - MUSE: 214 MS
PROT SERPL-MCNC: 7.3 G/DL (ref 6.4–8.3)
QRS DURATION - MUSE: 70 MS
QT - MUSE: 386 MS
QTC - MUSE: 395 MS
R AXIS - MUSE: 6 DEGREES
RBC # BLD AUTO: 4.16 10E6/UL (ref 3.8–5.2)
SODIUM SERPL-SCNC: 138 MMOL/L (ref 135–145)
SYSTOLIC BLOOD PRESSURE - MUSE: 142 MMHG
T AXIS - MUSE: 9 DEGREES
TROPONIN T SERPL HS-MCNC: 8 NG/L
VENTRICULAR RATE- MUSE: 63 BPM
WBC # BLD AUTO: 8.9 10E3/UL (ref 4–11)

## 2023-10-24 PROCEDURE — 93005 ELECTROCARDIOGRAM TRACING: CPT | Performed by: EMERGENCY MEDICINE

## 2023-10-24 PROCEDURE — 99204 OFFICE O/P NEW MOD 45 MIN: CPT | Mod: 57 | Performed by: SURGERY

## 2023-10-24 PROCEDURE — 96376 TX/PRO/DX INJ SAME DRUG ADON: CPT | Mod: XU

## 2023-10-24 PROCEDURE — 85048 AUTOMATED LEUKOCYTE COUNT: CPT | Performed by: EMERGENCY MEDICINE

## 2023-10-24 PROCEDURE — 82248 BILIRUBIN DIRECT: CPT | Performed by: EMERGENCY MEDICINE

## 2023-10-24 PROCEDURE — 96375 TX/PRO/DX INJ NEW DRUG ADDON: CPT | Mod: XU

## 2023-10-24 PROCEDURE — G0378 HOSPITAL OBSERVATION PER HR: HCPCS

## 2023-10-24 PROCEDURE — G1010 CDSM STANSON: HCPCS

## 2023-10-24 PROCEDURE — 96375 TX/PRO/DX INJ NEW DRUG ADDON: CPT

## 2023-10-24 PROCEDURE — 74177 CT ABD & PELVIS W/CONTRAST: CPT | Mod: MG

## 2023-10-24 PROCEDURE — 85652 RBC SED RATE AUTOMATED: CPT | Performed by: EMERGENCY MEDICINE

## 2023-10-24 PROCEDURE — 250N000011 HC RX IP 250 OP 636: Performed by: EMERGENCY MEDICINE

## 2023-10-24 PROCEDURE — 84484 ASSAY OF TROPONIN QUANT: CPT | Performed by: EMERGENCY MEDICINE

## 2023-10-24 PROCEDURE — 80053 COMPREHEN METABOLIC PANEL: CPT | Performed by: EMERGENCY MEDICINE

## 2023-10-24 PROCEDURE — 96365 THER/PROPH/DIAG IV INF INIT: CPT | Mod: XU

## 2023-10-24 PROCEDURE — 250N000011 HC RX IP 250 OP 636: Mod: JZ | Performed by: STUDENT IN AN ORGANIZED HEALTH CARE EDUCATION/TRAINING PROGRAM

## 2023-10-24 PROCEDURE — 250N000011 HC RX IP 250 OP 636: Mod: JZ | Performed by: EMERGENCY MEDICINE

## 2023-10-24 PROCEDURE — 83690 ASSAY OF LIPASE: CPT | Performed by: EMERGENCY MEDICINE

## 2023-10-24 PROCEDURE — 96361 HYDRATE IV INFUSION ADD-ON: CPT

## 2023-10-24 PROCEDURE — 99223 1ST HOSP IP/OBS HIGH 75: CPT | Mod: AI | Performed by: STUDENT IN AN ORGANIZED HEALTH CARE EDUCATION/TRAINING PROGRAM

## 2023-10-24 PROCEDURE — 99285 EMERGENCY DEPT VISIT HI MDM: CPT | Mod: 25

## 2023-10-24 PROCEDURE — 258N000003 HC RX IP 258 OP 636: Performed by: EMERGENCY MEDICINE

## 2023-10-24 PROCEDURE — 258N000003 HC RX IP 258 OP 636: Performed by: STUDENT IN AN ORGANIZED HEALTH CARE EDUCATION/TRAINING PROGRAM

## 2023-10-24 PROCEDURE — 250N000013 HC RX MED GY IP 250 OP 250 PS 637: Performed by: STUDENT IN AN ORGANIZED HEALTH CARE EDUCATION/TRAINING PROGRAM

## 2023-10-24 PROCEDURE — 36415 COLL VENOUS BLD VENIPUNCTURE: CPT | Performed by: EMERGENCY MEDICINE

## 2023-10-24 RX ORDER — PIPERACILLIN SODIUM, TAZOBACTAM SODIUM 3; .375 G/15ML; G/15ML
3.38 INJECTION, POWDER, LYOPHILIZED, FOR SOLUTION INTRAVENOUS EVERY 8 HOURS
Status: DISCONTINUED | OUTPATIENT
Start: 2023-10-24 | End: 2023-10-26 | Stop reason: HOSPADM

## 2023-10-24 RX ORDER — SODIUM CHLORIDE 9 MG/ML
INJECTION, SOLUTION INTRAVENOUS CONTINUOUS
Status: DISCONTINUED | OUTPATIENT
Start: 2023-10-24 | End: 2023-10-25

## 2023-10-24 RX ORDER — PIPERACILLIN SODIUM, TAZOBACTAM SODIUM 3; .375 G/15ML; G/15ML
3.38 INJECTION, POWDER, LYOPHILIZED, FOR SOLUTION INTRAVENOUS ONCE
Status: COMPLETED | OUTPATIENT
Start: 2023-10-24 | End: 2023-10-24

## 2023-10-24 RX ORDER — BENZONATATE 100 MG/1
100 CAPSULE ORAL 3 TIMES DAILY PRN
Status: DISCONTINUED | OUTPATIENT
Start: 2023-10-24 | End: 2023-10-24

## 2023-10-24 RX ORDER — PROCHLORPERAZINE 25 MG
12.5 SUPPOSITORY, RECTAL RECTAL EVERY 12 HOURS PRN
Status: DISCONTINUED | OUTPATIENT
Start: 2023-10-24 | End: 2023-10-26 | Stop reason: HOSPADM

## 2023-10-24 RX ORDER — ONDANSETRON 2 MG/ML
4 INJECTION INTRAMUSCULAR; INTRAVENOUS EVERY 30 MIN PRN
Status: DISCONTINUED | OUTPATIENT
Start: 2023-10-24 | End: 2023-10-26 | Stop reason: HOSPADM

## 2023-10-24 RX ORDER — MECLIZINE HCL 12.5 MG 12.5 MG/1
12.5 TABLET ORAL 3 TIMES DAILY PRN
Status: DISCONTINUED | OUTPATIENT
Start: 2023-10-24 | End: 2023-10-26 | Stop reason: HOSPADM

## 2023-10-24 RX ORDER — ONDANSETRON 4 MG/1
4 TABLET, ORALLY DISINTEGRATING ORAL EVERY 6 HOURS PRN
Status: DISCONTINUED | OUTPATIENT
Start: 2023-10-24 | End: 2023-10-26 | Stop reason: HOSPADM

## 2023-10-24 RX ORDER — GLYCOPYRROLATE 1 MG/1
1 TABLET ORAL
COMMUNITY

## 2023-10-24 RX ORDER — HYDRALAZINE HYDROCHLORIDE 10 MG/1
10 TABLET, FILM COATED ORAL 3 TIMES DAILY PRN
Status: DISCONTINUED | OUTPATIENT
Start: 2023-10-24 | End: 2023-10-26 | Stop reason: HOSPADM

## 2023-10-24 RX ORDER — ACETAMINOPHEN 500 MG
500-1000 TABLET ORAL EVERY 6 HOURS PRN
COMMUNITY

## 2023-10-24 RX ORDER — KETOROLAC TROMETHAMINE 15 MG/ML
15 INJECTION, SOLUTION INTRAMUSCULAR; INTRAVENOUS ONCE
Status: COMPLETED | OUTPATIENT
Start: 2023-10-24 | End: 2023-10-24

## 2023-10-24 RX ORDER — ACETAMINOPHEN 325 MG/1
650 TABLET ORAL EVERY 6 HOURS PRN
Status: DISCONTINUED | OUTPATIENT
Start: 2023-10-24 | End: 2023-10-26 | Stop reason: HOSPADM

## 2023-10-24 RX ORDER — ACETAMINOPHEN 650 MG/1
650 SUPPOSITORY RECTAL EVERY 6 HOURS PRN
Status: DISCONTINUED | OUTPATIENT
Start: 2023-10-24 | End: 2023-10-26 | Stop reason: HOSPADM

## 2023-10-24 RX ORDER — ONDANSETRON 2 MG/ML
4 INJECTION INTRAMUSCULAR; INTRAVENOUS EVERY 6 HOURS PRN
Status: DISCONTINUED | OUTPATIENT
Start: 2023-10-24 | End: 2023-10-26 | Stop reason: HOSPADM

## 2023-10-24 RX ORDER — LISINOPRIL 10 MG/1
10 TABLET ORAL DAILY
Status: DISCONTINUED | OUTPATIENT
Start: 2023-10-25 | End: 2023-10-26 | Stop reason: HOSPADM

## 2023-10-24 RX ORDER — LISINOPRIL 10 MG/1
10 TABLET ORAL DAILY
COMMUNITY

## 2023-10-24 RX ORDER — HYDROMORPHONE HYDROCHLORIDE 1 MG/ML
0.2 INJECTION, SOLUTION INTRAMUSCULAR; INTRAVENOUS; SUBCUTANEOUS EVERY 4 HOURS PRN
Status: DISCONTINUED | OUTPATIENT
Start: 2023-10-24 | End: 2023-10-26 | Stop reason: HOSPADM

## 2023-10-24 RX ORDER — IOPAMIDOL 755 MG/ML
71 INJECTION, SOLUTION INTRAVASCULAR ONCE
Status: COMPLETED | OUTPATIENT
Start: 2023-10-24 | End: 2023-10-24

## 2023-10-24 RX ORDER — GLYCOPYRROLATE 1 MG/1
1 TABLET ORAL
Status: DISCONTINUED | OUTPATIENT
Start: 2023-10-24 | End: 2023-10-26 | Stop reason: HOSPADM

## 2023-10-24 RX ORDER — PROCHLORPERAZINE MALEATE 5 MG
5 TABLET ORAL EVERY 6 HOURS PRN
Status: DISCONTINUED | OUTPATIENT
Start: 2023-10-24 | End: 2023-10-26 | Stop reason: HOSPADM

## 2023-10-24 RX ADMIN — ACETAMINOPHEN 650 MG: 325 TABLET ORAL at 16:30

## 2023-10-24 RX ADMIN — PIPERACILLIN AND TAZOBACTAM 3.38 G: 3; .375 INJECTION, POWDER, LYOPHILIZED, FOR SOLUTION INTRAVENOUS at 19:52

## 2023-10-24 RX ADMIN — KETOROLAC TROMETHAMINE 15 MG: 15 INJECTION INTRAMUSCULAR; INTRAVENOUS at 11:32

## 2023-10-24 RX ADMIN — ONDANSETRON 4 MG: 2 INJECTION INTRAMUSCULAR; INTRAVENOUS at 11:44

## 2023-10-24 RX ADMIN — SODIUM CHLORIDE 500 ML: 9 INJECTION, SOLUTION INTRAVENOUS at 14:16

## 2023-10-24 RX ADMIN — PIPERACILLIN AND TAZOBACTAM 3.38 G: 3; .375 INJECTION, POWDER, LYOPHILIZED, FOR SOLUTION INTRAVENOUS at 14:11

## 2023-10-24 RX ADMIN — ACETAMINOPHEN 650 MG: 325 TABLET ORAL at 22:49

## 2023-10-24 RX ADMIN — HYDRALAZINE HYDROCHLORIDE 10 MG: 10 TABLET, FILM COATED ORAL at 16:30

## 2023-10-24 RX ADMIN — IOPAMIDOL 71 ML: 755 INJECTION, SOLUTION INTRAVENOUS at 12:16

## 2023-10-24 RX ADMIN — HYDROMORPHONE HYDROCHLORIDE 0.2 MG: 1 INJECTION, SOLUTION INTRAMUSCULAR; INTRAVENOUS; SUBCUTANEOUS at 19:52

## 2023-10-24 RX ADMIN — HYDROMORPHONE HYDROCHLORIDE 1 MG: 2 TABLET ORAL at 15:37

## 2023-10-24 RX ADMIN — SODIUM CHLORIDE: 9 INJECTION, SOLUTION INTRAVENOUS at 15:37

## 2023-10-24 ASSESSMENT — ACTIVITIES OF DAILY LIVING (ADL)
ADLS_ACUITY_SCORE: 35

## 2023-10-24 NOTE — H&P
[facility]    History and Physical - Hospitalist Service       Date of Admission:  10/24/2023    Assessment & Plan      Carmen Rowe is a 83 year old female admitted on 10/24/2023. She has a past medical history of hypertension, reflux, and known gallstone in the past, who was admitted with plan for cholecystectomy this evening.    On admission is hypertensive 186/77, labs notable for normal BMP, normal LFTs, anemia hemoglobin 10.9, platelets 368, CT chest abdomen pelvis demonstrates distended gallbladder 13 cm with large dependent gallstones but did not note wall thickening or edema, large hiatal hernia noted extensive colonic diverticulosis as well as bilateral adrenal nodules and evidence of prior granulomatous disease, and multiple cystic changes on the liver that is decreased compared to 2006.  General surgery is consulted.  Of note, initially plan was for surgery on 10/25, but per ER team, has been able to be moved up to tonight at 8 PM on 10/24.    Addendum- surgery confirmed for AM; reinstate clear liquid diet, npo at midnight and added on breakthrough-severe pain iv dilaudid low dose up to 3 doses over 12 hours.  Marcos Dickey MD on 10/24/2023 at 7:37 PM    ------    Cholelithiasis with right upper quadrant abdominal pain and nausea and vomiting  Known history of cholelithiasis  Clinically concerning for acute cholecystitis developing  Assessment: As above, patient has had this on and off for multiple years and appears it is patient planning to have intervention.  Currently on admission is hemodynamically vitally stable.  Plan:-Admit inpatient  -Consult to general surgery, patient  -Initial plan is for surgery tomorrow but will be tonight  -Pain control nausea control  -Conservative Tylenol and for breakthrough we will have Dilaudid  -IV fluids overnight    Hypertension  Assessment: It is elevated in setting of pain on admission.  On a regimen of  "lisinopril.  On-call  -Continue lisinopril  -We will have as needed hydralazine in the interim  -Pain control should help as well    Reflux  A/P-appears stable, could start Tums or Protonix if recurrent symptoms     Chronic dizziness  A/p- appears stable, continue meclizine        Diet: NPO per Anesthesia Guidelines for Procedure/Surgery Except for: Meds   DVT Prophylaxis: Pneumatic Compression Devices and Ambulate every shift and will have surgery tonight   De Anda Catheter: Not present  Lines: None     Cardiac Monitoring: None  Code Status: Full Code     Clinically Significant Risk Factors Present on Admission                   # Hypertension: Noted on problem list      # Overweight: Estimated body mass index is 25.69 kg/m  as calculated from the following:    Height as of this encounter: 1.6 m (5' 3\").    Weight as of this encounter: 65.8 kg (145 lb).              Disposition Plan      Expected Discharge Date: 10/25/2023                  Marcos Dickey MD  Hospitalist Service  [facility]  Securely message with [employer] (more info)  Text page via [facility] Paging/Directory     ______________________________________________________________________    Chief Complaint   Abd pain    History is obtained from the patient    History of Present Illness   Carmen Rowe is a 83 year old female who has a past medical history of hypertension, reflux, and known gallstone in the past, who was admitted with plan for cholecystectomy this evening.    On admission, patient confirms history as aforementioned including known gallbladder stone for many years, and it became worse over the past day prompting ER presentation, without other accompanying symptoms.  Discussed plan for surgery evaluation and they are on board for surgery, initially was scheduled for tomorrow morning but appears it might be able to be moved up sooner to tonight.  Confirmed plan with the ER provider as well with the bedside " nurse.    Patient has no headaches or fevers or chills or chest pain or dyspnea or tachypnea or shortness of breath or current nausea or any vomiting, no abdominal pain at rest right now, no dysuria or constipation or diarrhea or neurologic sensory changes.    She is full code, lives with the .    Past Medical History    History reviewed. No pertinent past medical history.    Past Surgical History   History reviewed. No pertinent surgical history.    Prior to Admission Medications   Prior to Admission Medications   Prescriptions Last Dose Informant Patient Reported? Taking?   acetaminophen (TYLENOL) 500 MG tablet PRN  Yes Yes   Sig: Take 500-1,000 mg by mouth every 6 hours as needed for mild pain   glycopyrrolate (ROBINUL) 1 MG tablet PRN  Yes Yes   Sig: Take 1 mg by mouth nightly as needed (Excessive Secretions)   lisinopril (ZESTRIL) 10 MG tablet 10/24/2023 at am  Yes Yes   Sig: Take 10 mg by mouth daily   meclizine (ANTIVERT) 25 MG tablet PRN  No Yes   Sig: Take 0.5 tablets (12.5 mg) by mouth 3 times daily as needed for dizziness or nausea      Facility-Administered Medications: None        Review of Systems    The 10 point Review of Systems is negative other than noted in the HPI or here.      Social History   I have reviewed this patient's social history and updated it with pertinent information if needed.  Social History     Tobacco Use    Smoking status: Never     Passive exposure: Never    Smokeless tobacco: Never   Substance Use Topics    Alcohol use: Never    Drug use: Never     Allergies   Allergies   Allergen Reactions    Guaifenesin-Codeine Unknown     Other reaction(s): mouth swelling    Hydrocodone-Acetaminophen Unknown     Other reaction(s): nausea and vomiting        Physical Exam   Vital Signs: Temp: 97.7  F (36.5  C) Temp src: Oral BP: (!) 184/95 Pulse: 73   Resp: 20 SpO2: 96 % O2 Device: Nasal cannula    Weight: 145 lbs 0 oz      GENERAL:  Alert, appears comfortable, in no acute distress,  appears stated age   HEAD:  Normocephalic, without obvious abnormality, atraumatic   EYES:  PERRL, conjunctiva/corneas clear, no scleral icterus, EOM's intact   NOSE: Nares normal, septum midline, mucosa normal, no drainage   THROAT: Lips, mucosa, and tongue normal; teeth and gums normal, mouth moist   NECK: Supple, symmetrical, trachea midline   BACK:   Symmetric, no curvature    LUNGS:   Clear to auscultation bilaterally, no rales, rhonchi, or wheezing, symmetric chest rise on inhalation, respirations unlabored, on room air    CHEST WALL:  No tenderness or conspicuous deformity   HEART:  Regular rate and rhythm, S1 and S2 normal, no murmur, rub, or gallop, no conspicuous jugular venous distention noted, peripheral pulses intact    ABDOMEN:   Remains soft, tender in the right quadrants including positive Del Iro sign, bowel sounds auscultated in all four quadrants, no masses, no organomegaly, no rebound or guarding   EXTREMITIES: Extremities normal, atraumatic, no cyanosis or edema    SKIN: Dry to touch, no exanthems in the visualized areas   NEURO: Alert, oriented x3, moves all four extremities of their own volition    PSYCH: Cooperative and behavior is appropriate        Medical Decision Making       82 MINUTES SPENT BY ME on the date of service doing chart review, history, exam, documentation & further activities per the note.      Data   ------------------------- PAST 24 HR DATA REVIEWED -----------------------------------------------    I have personally reviewed the following data over the past 24 hrs:    8.9  \   10.9 (L)   / 368     138 104 22.0 /  122 (H)   3.9 23 0.95 \     ALT: 12 AST: 19 AP: 86 TBILI: 0.3   ALB: 4.4 TOT PROTEIN: 7.3 LIPASE: 38     Trop: 8 BNP: N/A       Imaging results reviewed over the past 24 hrs:   Recent Results (from the past 24 hour(s))   CT Chest/Abdomen/Pelvis w Contrast    Narrative    EXAM: CT CHEST/ABDOMEN/PELVIS W CONTRAST  LOCATION: [facility]  HOSPITAL  DATE: 10/24/2023    INDICATION: Awoke with stabbing right upper quadrant pain with nausea.  COMPARISON: CTA CAP 02/23/2021, CT AP 07/19/2006  TECHNIQUE: CT scan of the chest, abdomen, and pelvis was performed following injection of IV contrast. Multiplanar reformats were obtained. Dose reduction techniques were used.   CONTRAST: Isovue 370 71mL    FINDINGS:   LUNGS AND PLEURA: Mild cylindrical bronchiectasis in the lower lobes. No retained secretions. Calcified granuloma in the right upper lobe and small fissural nodule in the right midlung are stable.    MEDIASTINUM/AXILLAE: Large hiatal hernia with a partially intrathoracic stomach again noted. Upper esophagus is mildly distended with fluid. Aorta and branches are normal. No central pulmonary emboli    CORONARY ARTERY CALCIFICATION: Moderate.    HEPATOBILIARY: Gallbladder is distended to 13 cm. There is a large dependent stone within the gallbladder neck. No wall thickening or pericholecystic stranding. Multiple cystic changes are seen within the liver. Largest is a small conglomeration of cysts   superiorly in segment 4A with a few punctate dystrophic calcification within it. These have clearly decreased in size since 2006. No suspicious liver lesions.    PANCREAS: Normal.    SPLEEN: Calcified granulomas in the spleen.    ADRENAL GLANDS: Nodular thickening of both adrenal glands, left greater than right with the left adrenal nodule measuring 1.3 cm.    KIDNEYS/BLADDER: Normal.    BOWEL: Quite redundant colon with diffuse colonic diverticulosis. No inflammatory changes. Bowel is of normal caliber.    LYMPH NODES: There are a few less than 1 cm upper para-aortic nodes, unchanged..    VASCULATURE: Extensive arterial calcifications with ectatic aorta. No aneurysm. Vessels are patent.    PELVIC ORGANS: Normal.    MUSCULOSKELETAL: Moderate facet arthropathy L3-L5. No suspicious bone lesions. No fractures.      Impression    IMPRESSION:  1.  Gallbladder is  distended to 13 cm and has a large dependent gallstone. No wall thickening or pericholecystic fluid. Gallbladder ultrasound can assess this further if there is concern for an early cholecystitis.  2.  Large hiatal hernia with a partially intrathoracic stomach again noted with fluid within the esophagus, presumed due to reflux.  3.  Extensive colonic diverticulosis without evidence of diverticulitis.  4.  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 These have developed since 2006. If there is a clinical need  to further evaluate these, noncontrast adrenal CT can be done in the future.  5.  Evidence of previous granulomatous disease.  6.  Multiple cystic changes in the liver, decreased in size since 2006.  7.  Other noncritical findings as noted above.

## 2023-10-24 NOTE — ED PROVIDER NOTES
"EMERGENCY DEPARTMENT ENCOUNTER            IMPRESSION:  Symptomatic biliary colic        MEDICAL DECISION MAKING:  It was my pleasure to provide care for Carmen Rowe who presented for evaluation of right upper quadrant abdominal pain    On my exam patient is pleasant and cooperative.   Vital signs show hypertension.  Physical exam notable for right upper quadrant tenderness.     Pain medication administered for symptom relief.  Patient's symptoms improved.     EKG independently interpreted myself and does not show ischemia or arrhythmia    Laboratory investigation independently interpreted and notable for normal CBC chemistry and LFTs    CT imaging of the chest and abdomen shows distended gallbladder without evidence of cholecystitis.  Imaging independently interpreted myself and shows cholelithiasis    ED evaluation is consistent with symptomatic biliary colic    Surgery was consulted.  OR not available until later this evening    Broad-spectrum antibiotics administered    I spoke with the admitting hospitalist      Prior to making a final disposition on this patient the results of patient's tests and other diagnostic studies were discussed with the patient. All questions were answered. Patient expressed understanding of the plan and was amenable.     =================================================================  CHIEF COMPLAINT:  Chief Complaint   Patient presents with    Breast Pain         HPI  Carmen Rowe is a 83 year old female with a history of hypertension, liver disease, and GERD who presents to the ED by walk-in for evaluation of RUQ pain.    The patient presents with pain from her epigastrium and wraps around to her RUQ and right flank pain. This woke her up at 2:00 AM this morning with \"severe, stabbing\" aching pain. This pain is worse with deep breaths but not noticeably worse with food. She has felt short of breath with exertion. She has been nauseated and tried taking Tylenol but " "vomited prior to arrival and has miranda vomiting in the waiting room here. She denies any other complaints at this time.     REVIEW OF SYSTEMS  Constitutional: Does not report chills, unintentional weight loss or fatigue   Eyes: Does not report visual changes or discharge    HENT: Does not report sore throat, ear pain or neck pain  Respiratory: Does not report cough. Positive for shortness of breath.  Cardiovascular: Does not report chest pain, palpitations or leg swelling  GI: Does not report  dark, bloody stools.  Positive for RUQ abdominal pain, nausea, vomiting, and right flank pain.  : Does not report hematuria, dysuria, or flank pain  Musculoskeletal: Does not report any new musculoskeletal pain or new muscle/joint pains  Skin: Does not report rash or wound  Neurologic: Does not report current headache, new weakness, focal weakness, or sensory changes        Remainder of systems reviewed, unless noted in HPI all others negative.      PAST MEDICAL HISTORY:  History reviewed. No pertinent past medical history.    PAST SURGICAL HISTORY:  History reviewed. No pertinent surgical history.      CURRENT MEDICATIONS:    aspirin 81 mg chewable tablet  lisinopriL-hydrochlorothiazide (PRINZIDE,ZESTORETIC) 10-12.5 mg per tablet  meclizine (ANTIVERT) 25 MG tablet        ALLERGIES:  Allergies   Allergen Reactions    Guaifenesin-Codeine Unknown     Other reaction(s): mouth swelling    Hydrocodone-Acetaminophen Unknown     Other reaction(s): nausea and vomiting       FAMILY HISTORY:  No family history on file.    SOCIAL HISTORY:   Social History     Socioeconomic History    Marital status:    Tobacco Use    Smoking status: Never     Passive exposure: Never    Smokeless tobacco: Never   Substance and Sexual Activity    Alcohol use: Never    Drug use: Never       PHYSICAL EXAM:    BP (!) 186/77   Pulse 63   Temp 97.7  F (36.5  C) (Oral)   Resp 20   Ht 1.6 m (5' 3\")   Wt 65.8 kg (145 lb)   SpO2 97%   BMI 25.69 kg/m  "       Constitutional: Awake, alert, uncomfortable  Head: Normocephalic, atraumatic.  ENT: Mucous membranes are moist.  No pallor.   Eyes: Pupils are reactive.  No discoloration.  Neck: No lymphadenopathy, no stridor, supple, no soft tissue swelling  Chest: No tenderness   Respiratory: Respirations even, unlabored. Lungs clear to ascultation bilaterally, in no acute respiratory distress.  Cardiovascular: Regular rate and rhythm.  Good overall perfusion.  Upper and lower extremity pulses are equal.  GI: Right upper quadrant tenderness to palpation  Back: No CVA tenderness.    Musculoskeletal: Moves all 4 extremities equally, full function and capacity no peripheral edema.   Integument: Warm, dry. No rash. No bruising or petechiae.  Neurologic: Alert & oriented x 3. Normal speech. Grossly normal motor and sensory function. No focal deficits noted.  NIHSS = 0  Psychiatric: Normal mood and affect.  Appropriate judgement.    ED COURSE:  11:14 AM I met with the patient, obtained history, performed an initial exam, and discussed options and plan for diagnostics and treatment here in the ED.   11:43 AM The patient is still vomiting. Zofran ordered.  1:27 PM I spoke with the surgeon, Dr. Gonzales Smith.         Medical Decision Making    History:  Supplemental history from: NA  External Record(s) reviewed: External medical records including care everywhere reviewed    Work Up:  EKG, laboratory and imaging studies as ordered were independently interpreted by myself.   Broad differential diagnosis considered for abdominal  The patient's presentation was of high complexity.     External consultation:  Discussion of management with another provider: Surgery and admitting hospitalist    Complicating factors:  Patient has a complicated past medical history including   Care affected by social determinants of health: Access to primary care    Disposition involved shared decision-making with the patient.      LAB:  Laboratory results were  independently reviewed and interpreted  Results for orders placed or performed during the hospital encounter of 10/24/23   CT Chest/Abdomen/Pelvis w Contrast    Impression    IMPRESSION:  1.  Gallbladder is distended to 13 cm and has a large dependent gallstone. No wall thickening or pericholecystic fluid. Gallbladder ultrasound can assess this further if there is concern for an early cholecystitis.  2.  Large hiatal hernia with a partially intrathoracic stomach again noted with fluid within the esophagus, presumed due to reflux.  3.  Extensive colonic diverticulosis without evidence of diverticulitis.  4.  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 These have developed since 2006. If there is a clinical need  to further evaluate these, noncontrast adrenal CT can be done in the future.  5.  Evidence of previous granulomatous disease.  6.  Multiple cystic changes in the liver, decreased in size since 2006.  7.  Other noncritical findings as noted above.   Hepatic function panel   Result Value Ref Range    Protein Total 7.3 6.4 - 8.3 g/dL    Albumin 4.4 3.5 - 5.2 g/dL    Bilirubin Total 0.3 <=1.2 mg/dL    Alkaline Phosphatase 86 35 - 104 U/L    AST 19 0 - 45 U/L    ALT 12 0 - 50 U/L    Bilirubin Direct <0.20 0.00 - 0.30 mg/dL   Result Value Ref Range    Lipase 38 13 - 60 U/L   Basic metabolic panel   Result Value Ref Range    Sodium 138 135 - 145 mmol/L    Potassium 3.9 3.4 - 5.3 mmol/L    Chloride 104 98 - 107 mmol/L    Carbon Dioxide (CO2) 23 22 - 29 mmol/L    Anion Gap 11 7 - 15 mmol/L    Urea Nitrogen 22.0 8.0 - 23.0 mg/dL    Creatinine 0.95 0.51 - 0.95 mg/dL    GFR Estimate 59 (L) >60 mL/min/1.73m2    Calcium 9.5 8.8 - 10.2 mg/dL    Glucose 122 (H) 70 - 99 mg/dL   Troponin T, High Sensitivity (now)   Result Value Ref Range    Troponin T, High Sensitivity 8 <=14 ng/L   CBC with platelets and differential   Result Value Ref Range    WBC Count 8.9 4.0 - 11.0 10e3/uL    RBC Count 4.16 3.80 - 5.20  10e6/uL    Hemoglobin 10.9 (L) 11.7 - 15.7 g/dL    Hematocrit 35.1 35.0 - 47.0 %    MCV 84 78 - 100 fL    MCH 26.2 (L) 26.5 - 33.0 pg    MCHC 31.1 (L) 31.5 - 36.5 g/dL    RDW 15.1 (H) 10.0 - 15.0 %    Platelet Count 368 150 - 450 10e3/uL    % Neutrophils 80 %    % Lymphocytes 15 %    % Monocytes 4 %    % Eosinophils 1 %    % Basophils 0 %    % Immature Granulocytes 0 %    NRBCs per 100 WBC 0 <1 /100    Absolute Neutrophils 7.1 1.6 - 8.3 10e3/uL    Absolute Lymphocytes 1.3 0.8 - 5.3 10e3/uL    Absolute Monocytes 0.4 0.0 - 1.3 10e3/uL    Absolute Eosinophils 0.1 0.0 - 0.7 10e3/uL    Absolute Basophils 0.0 0.0 - 0.2 10e3/uL    Absolute Immature Granulocytes 0.0 <=0.4 10e3/uL    Absolute NRBCs 0.0 10e3/uL   Erythrocyte sedimentation rate auto   Result Value Ref Range    Erythrocyte Sedimentation Rate 16 0 - 30 mm/hr   ECG 12-LEAD WITH MUSE ([facility])   Result Value Ref Range    Systolic Blood Pressure 142 mmHg    Diastolic Blood Pressure 89 mmHg    Ventricular Rate 63 BPM    Atrial Rate 63 BPM    CO Interval 214 ms    QRS Duration 70 ms     ms    QTc 395 ms    P Axis 18 degrees    R AXIS 6 degrees    T Axis 9 degrees    Interpretation ECG       Sinus rhythm with 1st degree A-V block  Low voltage QRS  Borderline ECG  When compared with ECG of 14-MAY-2023 21:32,  No significant change was found  Confirmed by SEE ED PROVIDER NOTE FOR, ECG INTERPRETATION ([id number]),  CRIS HANEY ([id number]) on 10/24/2023 9:23:55 AM           RADIOLOGY:  Radiology reports were independently reviewed and interpreted  CT Chest/Abdomen/Pelvis w Contrast   Final Result   IMPRESSION:   1.  Gallbladder is distended to 13 cm and has a large dependent gallstone. No wall thickening or pericholecystic fluid. Gallbladder ultrasound can assess this further if there is concern for an early cholecystitis.   2.  Large hiatal hernia with a partially intrathoracic stomach again noted with fluid within the esophagus, presumed due to reflux.   3.   Extensive colonic diverticulosis without evidence of diverticulitis.   4.  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 These have developed since 2006. If there is a clinical need  to further evaluate these, noncontrast adrenal CT can be done in the future.   5.  Evidence of previous granulomatous disease.   6.  Multiple cystic changes in the liver, decreased in size since 2006.   7.  Other noncritical findings as noted above.           EKG:    ECG results from 10/24/23   ECG 12-LEAD WITH MUSE ([facility])     Value    Systolic Blood Pressure 142    Diastolic Blood Pressure 89    Ventricular Rate 63    Atrial Rate 63    ME Interval 214    QRS Duration 70        QTc 395    P Axis 18    R AXIS 6    T Axis 9    Interpretation ECG      Sinus rhythm with 1st degree A-V block  Low voltage QRS  Borderline ECG  When compared with ECG of 14-MAY-2023 21:32,  No significant change was found  Confirmed by SEE ED PROVIDER NOTE FOR, ECG INTERPRETATION ([id number]),  CRIS HANEY ([id number]) on 10/24/2023 9:23:55 AM         I have independently reviewed and interpreted the EKG(s) documented above.        MEDICATIONS GIVEN IN THE EMERGENCY:  Medications   ondansetron (ZOFRAN) injection 4 mg (4 mg Intravenous $Given 10/24/23 1144)   piperacillin-tazobactam (ZOSYN) 3.375 g vial to attach to  mL bag (3.375 g Intravenous $New Bag 10/24/23 1411)   sodium chloride 0.9% BOLUS 500 mL (500 mLs Intravenous $New Bag 10/24/23 1416)   ketorolac (TORADOL) injection 15 mg (15 mg Intravenous $Given 10/24/23 1132)   iopamidol (ISOVUE-370) solution 71 mL (71 mLs Intravenous $Given 10/24/23 1216)           NEW PRESCRIPTIONS STARTED AT TODAY'S ER VISIT:  New Prescriptions    No medications on file                FINAL DIAGNOSIS:    ICD-10-CM    1. Symptomatic cholelithiasis  K80.20 Case Request: CHOLECYSTECTOMY, LAPAROSCOPIC     Case Request: CHOLECYSTECTOMY, LAPAROSCOPIC      2. Biliary colic  K80.50                  NAME: Carmen  CHERI Rowe  AGE: 83 year old female  YOB: 1940  MRN: [id number]  EVALUATION DATE & TIME: No admission date for patient encounter.    PCP: System, Provider Not In    ED PROVIDER: Bakari Alarcon M.D.      Robbie ALMANZA, am serving as a scribe to document services personally performed by Dr. Bakari Alarcon based on my observation and the provider's statements to me. I, Bakari Alarcon MD attest that Robbie Bangura is acting in a scribe capacity, has observed my performance of the services and has documented them in accordance with my direction.    Bakari Alarcon M.D.  Emergency Medicine  [facility] EMERGENCY ROOM  [facility] [facility] [place] [address]  [phone]  Dept: [phone]  10/24/2023         Bakari Alarcon MD  10/24/23 1425

## 2023-10-24 NOTE — CONSULTS
General Surgery Consultation  Carmen Rowe MRN# [id number]   Age/Sex: 83 year old female YOB: 1940     Reason for consult: 1. Symptomatic cholelithiasis            Requesting physician: Dr. Bakari Alarcon                   Assessment and Plan:   Assessment:  RUQ abdominal pain  Nausea and vomiting  Cholelithiasis   Acute cholecystitis    Ms. Rowe is a 82 yo F admitted with RUQ abdominal pain x12 hours. Afebrile and hypertensive on admission. Labs unremarkable; no leukocytosis, normal LFT's, lipase and troponin. CT chest/abdomen/pelvis obtained and revealed distended gallbladder with a large dependent gallstone in the neck of the gallbladder. Also noted a large hiatal hernia. Discussed pathophysiology of cholecystitis with the patient. Recommend surgical management with laparoscopic cholecystectomy. Patient amendable to this plan.    Plan:  - Okay for clear liquid diet until midnight. NPO at midnight  - IV antibiotics  - Continue supportive cares for pain and nausea  - OR tomorrow morning for laparoscopic cholecystectomy          Chief Complaint:     Chief Complaint   Patient presents with    Breast Pain        History is obtained from the patient    HPI:   Carmen Rowe is a 83 year old female with PMH of HTN who presented to the [facility] Emergency Department with RUQ abdominal pain, nausea and vomiting x12 hours. Patient reports that she was awoken from sleep the evening prior to admission due to severe RUQ abdominal pain. Her pain radiated around her side and to her back. She tried tylenol for her pain with no relief. She reports trying some coffee and a few bites of a breakfast bar and shortly after had nausea and vomiting. She has since had intermittent bilious emesis. Given her persistent symptoms, she presented to the ED for further evaluation. Denies fever, chills, diarrhea, constipation, acholic stools, urinary changes. Currently, still having abdominal pain and nausea. Last  "vomited while in the ED waiting room. Reports that she has had a similar episode of pain before many years ago and was told at that time that she had gallstones and should get her gallbladder removed but deferred at that time. Her past surgical history includes appendectomy.          Past Medical History:   History reviewed. No pertinent past medical history.           Past Surgical History:   History reviewed. No pertinent surgical history.          Social History:    reports that she has never smoked. She has never been exposed to tobacco smoke. She has never used smokeless tobacco. She reports that she does not drink alcohol and does not use drugs.           Family History:   No family history on file.           Allergies:     Allergies   Allergen Reactions    Guaifenesin-Codeine Unknown     Other reaction(s): mouth swelling    Hydrocodone-Acetaminophen Unknown     Other reaction(s): nausea and vomiting              Medications:     Prior to Admission medications    Medication Sig Start Date End Date Taking? Authorizing Provider   aspirin 81 mg chewable tablet [ASPIRIN 81 MG CHEWABLE TABLET] Chew 81 mg daily.  Patient not taking: Reported on 7/11/2023 3/23/21   Provider, Historical   lisinopriL-hydrochlorothiazide (PRINZIDE,ZESTORETIC) 10-12.5 mg per tablet [LISINOPRIL-HYDROCHLOROTHIAZIDE (PRINZIDE,ZESTORETIC) 10-12.5 MG PER TABLET] Take 1 tablet by mouth daily. 3/23/21   Provider, Historical   meclizine (ANTIVERT) 25 MG tablet Take 0.5 tablets (12.5 mg) by mouth 3 times daily as needed for dizziness or nausea 5/15/23   Lorne Carlisle MD              Review of Systems:   The Review of Systems is negative other than noted in the HPI            Physical Exam:   Patient Vitals for the past 24 hrs:   BP Temp Temp src Pulse Resp SpO2 Height Weight   10/24/23 0846 (!) 186/77 97.7  F (36.5  C) Oral 63 20 97 % 1.6 m (5' 3\") 65.8 kg (145 lb)        No intake or output data in the 24 hours ending 10/24/23 1344 "   Constitutional:   awake, alert, cooperative, no apparent distress, and appears stated age       Eyes:   PERRL, conjunctiva/corneas clear, EOM's intact; no scleral edema or icterus noted        ENT:   Normocephalic, without obvious abnormality, atraumatic, Lips, mucosa, and tongue normal          Lungs:   Normal respiratory effort, no accessory muscle use       Cardiovascular:   Regular rate and rhythm       Abdomen:   Soft, tender to palpation over the RUQ. Non-distended. Positive Del Rio's sign.       Musculoskeletal:   No obvious swelling, bruising or deformity       Skin:   Skin color and texture normal for patient, no rashes or lesions              Data:         All imaging studies reviewed by me.    Results for orders placed or performed during the hospital encounter of 10/24/23 (from the past 24 hour(s))   ECG 12-LEAD WITH MUSE (E)   Result Value Ref Range    Systolic Blood Pressure 142 mmHg    Diastolic Blood Pressure 89 mmHg    Ventricular Rate 63 BPM    Atrial Rate 63 BPM    IN Interval 214 ms    QRS Duration 70 ms     ms    QTc 395 ms    P Axis 18 degrees    R AXIS 6 degrees    T Axis 9 degrees    Interpretation ECG       Sinus rhythm with 1st degree A-V block  Low voltage QRS  Borderline ECG  When compared with ECG of 14-MAY-2023 21:32,  No significant change was found  Confirmed by SEE ED PROVIDER NOTE FOR, ECG INTERPRETATION ([id number]),  CRIS HANEY ([id number]) on 10/24/2023 9:23:55 AM     CBC with platelets + differential    Narrative    The following orders were created for panel order CBC with platelets + differential.  Procedure                               Abnormality         Status                     ---------                               -----------         ------                     CBC with platelets and d...[[id number]]  Abnormal            Final result                 Please view results for these tests on the individual orders.   Hepatic function panel   Result Value Ref Range     Protein Total 7.3 6.4 - 8.3 g/dL    Albumin 4.4 3.5 - 5.2 g/dL    Bilirubin Total 0.3 <=1.2 mg/dL    Alkaline Phosphatase 86 35 - 104 U/L    AST 19 0 - 45 U/L    ALT 12 0 - 50 U/L    Bilirubin Direct <0.20 0.00 - 0.30 mg/dL   Lipase   Result Value Ref Range    Lipase 38 13 - 60 U/L   Basic metabolic panel   Result Value Ref Range    Sodium 138 135 - 145 mmol/L    Potassium 3.9 3.4 - 5.3 mmol/L    Chloride 104 98 - 107 mmol/L    Carbon Dioxide (CO2) 23 22 - 29 mmol/L    Anion Gap 11 7 - 15 mmol/L    Urea Nitrogen 22.0 8.0 - 23.0 mg/dL    Creatinine 0.95 0.51 - 0.95 mg/dL    GFR Estimate 59 (L) >60 mL/min/1.73m2    Calcium 9.5 8.8 - 10.2 mg/dL    Glucose 122 (H) 70 - 99 mg/dL   Troponin T, High Sensitivity (now)   Result Value Ref Range    Troponin T, High Sensitivity 8 <=14 ng/L   CBC with platelets and differential   Result Value Ref Range    WBC Count 8.9 4.0 - 11.0 10e3/uL    RBC Count 4.16 3.80 - 5.20 10e6/uL    Hemoglobin 10.9 (L) 11.7 - 15.7 g/dL    Hematocrit 35.1 35.0 - 47.0 %    MCV 84 78 - 100 fL    MCH 26.2 (L) 26.5 - 33.0 pg    MCHC 31.1 (L) 31.5 - 36.5 g/dL    RDW 15.1 (H) 10.0 - 15.0 %    Platelet Count 368 150 - 450 10e3/uL    % Neutrophils 80 %    % Lymphocytes 15 %    % Monocytes 4 %    % Eosinophils 1 %    % Basophils 0 %    % Immature Granulocytes 0 %    NRBCs per 100 WBC 0 <1 /100    Absolute Neutrophils 7.1 1.6 - 8.3 10e3/uL    Absolute Lymphocytes 1.3 0.8 - 5.3 10e3/uL    Absolute Monocytes 0.4 0.0 - 1.3 10e3/uL    Absolute Eosinophils 0.1 0.0 - 0.7 10e3/uL    Absolute Basophils 0.0 0.0 - 0.2 10e3/uL    Absolute Immature Granulocytes 0.0 <=0.4 10e3/uL    Absolute NRBCs 0.0 10e3/uL   CT Chest/Abdomen/Pelvis w Contrast    Narrative    EXAM: CT CHEST/ABDOMEN/PELVIS W CONTRAST  LOCATION: [facility]  DATE: 10/24/2023    INDICATION: Awoke with stabbing right upper quadrant pain with nausea.  COMPARISON: CTA CAP 02/23/2021, CT AP 07/19/2006  TECHNIQUE: CT scan of the chest,  abdomen, and pelvis was performed following injection of IV contrast. Multiplanar reformats were obtained. Dose reduction techniques were used.   CONTRAST: Isovue 370 71mL    FINDINGS:   LUNGS AND PLEURA: Mild cylindrical bronchiectasis in the lower lobes. No retained secretions. Calcified granuloma in the right upper lobe and small fissural nodule in the right midlung are stable.    MEDIASTINUM/AXILLAE: Large hiatal hernia with a partially intrathoracic stomach again noted. Upper esophagus is mildly distended with fluid. Aorta and branches are normal. No central pulmonary emboli    CORONARY ARTERY CALCIFICATION: Moderate.    HEPATOBILIARY: Gallbladder is distended to 13 cm. There is a large dependent stone within the gallbladder neck. No wall thickening or pericholecystic stranding. Multiple cystic changes are seen within the liver. Largest is a small conglomeration of cysts   superiorly in segment 4A with a few punctate dystrophic calcification within it. These have clearly decreased in size since 2006. No suspicious liver lesions.    PANCREAS: Normal.    SPLEEN: Calcified granulomas in the spleen.    ADRENAL GLANDS: Nodular thickening of both adrenal glands, left greater than right with the left adrenal nodule measuring 1.3 cm.    KIDNEYS/BLADDER: Normal.    BOWEL: Quite redundant colon with diffuse colonic diverticulosis. No inflammatory changes. Bowel is of normal caliber.    LYMPH NODES: There are a few less than 1 cm upper para-aortic nodes, unchanged..    VASCULATURE: Extensive arterial calcifications with ectatic aorta. No aneurysm. Vessels are patent.    PELVIC ORGANS: Normal.    MUSCULOSKELETAL: Moderate facet arthropathy L3-L5. No suspicious bone lesions. No fractures.      Impression    IMPRESSION:  1.  Gallbladder is distended to 13 cm and has a large dependent gallstone. No wall thickening or pericholecystic fluid. Gallbladder ultrasound can assess this further if there is concern for an early  cholecystitis.  2.  Large hiatal hernia with a partially intrathoracic stomach again noted with fluid within the esophagus, presumed due to reflux.  3.  Extensive colonic diverticulosis without evidence of diverticulitis.  4.  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 These have developed since 2006. If there is a clinical need  to further evaluate these, noncontrast adrenal CT can be done in the future.  5.  Evidence of previous granulomatous disease.  6.  Multiple cystic changes in the liver, decreased in size since 2006.  7.  Other noncritical findings as noted above.           Mae Brewster PA-C  [facility] General Surgery  [address]  [address] [place] [id number]

## 2023-10-24 NOTE — PHARMACY-ADMISSION MEDICATION HISTORY
Pharmacist Admission Medication History    Admission medication history is complete. The information provided in this note is only as accurate as the sources available at the time of the update.    Information Source(s): Patient, Clinic records, and CareEverywhere/SureScripts via in-person    Pertinent Information: Patient was able to confirm current medications and last known administration times.    Changes made to PTA medication list:  Added: Glycopyrrolate, tylenol, and lisinopril   Deleted: Lisinopril + HCTZ, and Aspirin  Changed: None    Medication Affordability:       Allergies reviewed with patient and updates made in EHR: yes    Medication History Completed By: Srinivas Burgos [facility] 10/24/2023 2:58 PM    PTA Med List   Medication Sig Last Dose    acetaminophen (TYLENOL) 500 MG tablet Take 500-1,000 mg by mouth every 6 hours as needed for mild pain PRN    glycopyrrolate (ROBINUL) 1 MG tablet Take 1 mg by mouth nightly as needed (Excessive Secretions) PRN    lisinopril (ZESTRIL) 10 MG tablet Take 10 mg by mouth daily 10/24/2023 at am    meclizine (ANTIVERT) 25 MG tablet Take 0.5 tablets (12.5 mg) by mouth 3 times daily as needed for dizziness or nausea PRN

## 2023-10-24 NOTE — ED TRIAGE NOTES
Triage Assessment (Adult)       Row Name 10/24/23 0867          Triage Assessment    Airway WDL WDL        Respiratory WDL    Respiratory WDL X  sob especially with exertion        Skin Circulation/Temperature WDL    Skin Circulation/Temperature WDL WDL        Cardiac WDL    Cardiac WDL X;chest pain     Cardiac Rhythm NSR

## 2023-10-24 NOTE — ED NOTES
Clarified with Dr Alarcon, pt to have surgery this evening at 2000. Dr Dickey also said pt to have surgery this evening.

## 2023-10-24 NOTE — ED NOTES
Pt requesting pain med. PA from surgery informed. Will look at chart and decide if she can order something for pt.

## 2023-10-24 NOTE — ED TRIAGE NOTES
Has slight aching in area under right breast yesterday. Awoke at 0200 with severe stabbing aching pain radiating into back. Feels short of breath especially with exertion. Nausea and tried to take tylenol and vomited it back up     Triage Assessment (Adult)       Row Name 10/24/23 0850          Triage Assessment    Airway WDL WDL        Respiratory WDL    Respiratory WDL X  sob especially with exertion        Skin Circulation/Temperature WDL    Skin Circulation/Temperature WDL WDL        Cardiac WDL    Cardiac WDL X;chest pain

## 2023-10-25 ENCOUNTER — ANESTHESIA (OUTPATIENT)
Dept: SURGERY | Facility: CLINIC | Age: 83
End: 2023-10-25
Payer: MEDICARE

## 2023-10-25 ENCOUNTER — APPOINTMENT (OUTPATIENT)
Dept: RADIOLOGY | Facility: CLINIC | Age: 83
End: 2023-10-25
Attending: STUDENT IN AN ORGANIZED HEALTH CARE EDUCATION/TRAINING PROGRAM
Payer: MEDICARE

## 2023-10-25 LAB
ALBUMIN SERPL BCG-MCNC: 3.6 G/DL (ref 3.5–5.2)
ALP SERPL-CCNC: 80 U/L (ref 35–104)
ALT SERPL W P-5'-P-CCNC: 49 U/L (ref 0–50)
ANION GAP SERPL CALCULATED.3IONS-SCNC: 9 MMOL/L (ref 7–15)
AST SERPL W P-5'-P-CCNC: 107 U/L (ref 0–45)
BILIRUB DIRECT SERPL-MCNC: <0.2 MG/DL (ref 0–0.3)
BILIRUB SERPL-MCNC: 0.6 MG/DL
BUN SERPL-MCNC: 20.1 MG/DL (ref 8–23)
CALCIUM SERPL-MCNC: 8.5 MG/DL (ref 8.8–10.2)
CHLORIDE SERPL-SCNC: 109 MMOL/L (ref 98–107)
CREAT SERPL-MCNC: 0.97 MG/DL (ref 0.51–0.95)
DEPRECATED HCO3 PLAS-SCNC: 20 MMOL/L (ref 22–29)
EGFRCR SERPLBLD CKD-EPI 2021: 57 ML/MIN/1.73M2
ERYTHROCYTE [DISTWIDTH] IN BLOOD BY AUTOMATED COUNT: 15.4 % (ref 10–15)
GLUCOSE SERPL-MCNC: 116 MG/DL (ref 70–99)
HCT VFR BLD AUTO: 31.2 % (ref 35–47)
HGB BLD-MCNC: 9.7 G/DL (ref 11.7–15.7)
MCH RBC QN AUTO: 26 PG (ref 26.5–33)
MCHC RBC AUTO-ENTMCNC: 31.1 G/DL (ref 31.5–36.5)
MCV RBC AUTO: 84 FL (ref 78–100)
NT-PROBNP SERPL-MCNC: 681 PG/ML (ref 0–1800)
PLATELET # BLD AUTO: 311 10E3/UL (ref 150–450)
PLATELET # BLD AUTO: 323 10E3/UL (ref 150–450)
POTASSIUM SERPL-SCNC: 4.1 MMOL/L (ref 3.4–5.3)
PROCALCITONIN SERPL IA-MCNC: 0.11 NG/ML
PROT SERPL-MCNC: 6 G/DL (ref 6.4–8.3)
RBC # BLD AUTO: 3.73 10E6/UL (ref 3.8–5.2)
SARS-COV-2 RNA RESP QL NAA+PROBE: NEGATIVE
SODIUM SERPL-SCNC: 138 MMOL/L (ref 135–145)
WBC # BLD AUTO: 12.2 10E3/UL (ref 4–11)

## 2023-10-25 PROCEDURE — 250N000009 HC RX 250: Performed by: NURSE ANESTHETIST, CERTIFIED REGISTERED

## 2023-10-25 PROCEDURE — 250N000011 HC RX IP 250 OP 636: Mod: JZ | Performed by: EMERGENCY MEDICINE

## 2023-10-25 PROCEDURE — 999N000141 HC STATISTIC PRE-PROCEDURE NURSING ASSESSMENT: Performed by: SURGERY

## 2023-10-25 PROCEDURE — 99233 SBSQ HOSP IP/OBS HIGH 50: CPT | Performed by: STUDENT IN AN ORGANIZED HEALTH CARE EDUCATION/TRAINING PROGRAM

## 2023-10-25 PROCEDURE — 250N000013 HC RX MED GY IP 250 OP 250 PS 637

## 2023-10-25 PROCEDURE — 71045 X-RAY EXAM CHEST 1 VIEW: CPT

## 2023-10-25 PROCEDURE — 85027 COMPLETE CBC AUTOMATED: CPT | Performed by: STUDENT IN AN ORGANIZED HEALTH CARE EDUCATION/TRAINING PROGRAM

## 2023-10-25 PROCEDURE — 96361 HYDRATE IV INFUSION ADD-ON: CPT | Mod: XU

## 2023-10-25 PROCEDURE — 87635 SARS-COV-2 COVID-19 AMP PRB: CPT | Performed by: STUDENT IN AN ORGANIZED HEALTH CARE EDUCATION/TRAINING PROGRAM

## 2023-10-25 PROCEDURE — 83880 ASSAY OF NATRIURETIC PEPTIDE: CPT | Performed by: STUDENT IN AN ORGANIZED HEALTH CARE EDUCATION/TRAINING PROGRAM

## 2023-10-25 PROCEDURE — 272N000001 HC OR GENERAL SUPPLY STERILE: Performed by: SURGERY

## 2023-10-25 PROCEDURE — 250N000013 HC RX MED GY IP 250 OP 250 PS 637: Performed by: SURGERY

## 2023-10-25 PROCEDURE — 47562 LAPAROSCOPIC CHOLECYSTECTOMY: CPT | Performed by: SURGERY

## 2023-10-25 PROCEDURE — 80053 COMPREHEN METABOLIC PANEL: CPT | Performed by: STUDENT IN AN ORGANIZED HEALTH CARE EDUCATION/TRAINING PROGRAM

## 2023-10-25 PROCEDURE — 258N000003 HC RX IP 258 OP 636: Performed by: STUDENT IN AN ORGANIZED HEALTH CARE EDUCATION/TRAINING PROGRAM

## 2023-10-25 PROCEDURE — 258N000003 HC RX IP 258 OP 636: Performed by: NURSE ANESTHETIST, CERTIFIED REGISTERED

## 2023-10-25 PROCEDURE — G0378 HOSPITAL OBSERVATION PER HR: HCPCS

## 2023-10-25 PROCEDURE — 88304 TISSUE EXAM BY PATHOLOGIST: CPT | Mod: TC | Performed by: SURGERY

## 2023-10-25 PROCEDURE — 360N000076 HC SURGERY LEVEL 3, PER MIN: Performed by: SURGERY

## 2023-10-25 PROCEDURE — 710N000010 HC RECOVERY PHASE 1, LEVEL 2, PER MIN: Performed by: SURGERY

## 2023-10-25 PROCEDURE — 36415 COLL VENOUS BLD VENIPUNCTURE: CPT | Performed by: SURGERY

## 2023-10-25 PROCEDURE — 250N000009 HC RX 250: Performed by: STUDENT IN AN ORGANIZED HEALTH CARE EDUCATION/TRAINING PROGRAM

## 2023-10-25 PROCEDURE — 370N000017 HC ANESTHESIA TECHNICAL FEE, PER MIN: Performed by: SURGERY

## 2023-10-25 PROCEDURE — 250N000009 HC RX 250: Performed by: SURGERY

## 2023-10-25 PROCEDURE — 250N000011 HC RX IP 250 OP 636: Mod: JZ | Performed by: SURGERY

## 2023-10-25 PROCEDURE — 258N000003 HC RX IP 258 OP 636: Performed by: SURGERY

## 2023-10-25 PROCEDURE — 250N000025 HC SEVOFLURANE, PER MIN: Performed by: SURGERY

## 2023-10-25 PROCEDURE — 250N000011 HC RX IP 250 OP 636: Performed by: SURGERY

## 2023-10-25 PROCEDURE — 36415 COLL VENOUS BLD VENIPUNCTURE: CPT | Performed by: STUDENT IN AN ORGANIZED HEALTH CARE EDUCATION/TRAINING PROGRAM

## 2023-10-25 PROCEDURE — 250N000013 HC RX MED GY IP 250 OP 250 PS 637: Performed by: STUDENT IN AN ORGANIZED HEALTH CARE EDUCATION/TRAINING PROGRAM

## 2023-10-25 PROCEDURE — 96376 TX/PRO/DX INJ SAME DRUG ADON: CPT | Mod: XU

## 2023-10-25 PROCEDURE — 250N000011 HC RX IP 250 OP 636: Performed by: NURSE ANESTHETIST, CERTIFIED REGISTERED

## 2023-10-25 PROCEDURE — 85049 AUTOMATED PLATELET COUNT: CPT | Mod: 91 | Performed by: SURGERY

## 2023-10-25 PROCEDURE — 84145 PROCALCITONIN (PCT): CPT | Performed by: STUDENT IN AN ORGANIZED HEALTH CARE EDUCATION/TRAINING PROGRAM

## 2023-10-25 PROCEDURE — 250N000011 HC RX IP 250 OP 636: Mod: JZ | Performed by: STUDENT IN AN ORGANIZED HEALTH CARE EDUCATION/TRAINING PROGRAM

## 2023-10-25 PROCEDURE — 96375 TX/PRO/DX INJ NEW DRUG ADDON: CPT | Mod: XU

## 2023-10-25 PROCEDURE — 82248 BILIRUBIN DIRECT: CPT | Performed by: STUDENT IN AN ORGANIZED HEALTH CARE EDUCATION/TRAINING PROGRAM

## 2023-10-25 RX ORDER — NALOXONE HYDROCHLORIDE 0.4 MG/ML
0.2 INJECTION, SOLUTION INTRAMUSCULAR; INTRAVENOUS; SUBCUTANEOUS
Status: DISCONTINUED | OUTPATIENT
Start: 2023-10-25 | End: 2023-10-26 | Stop reason: HOSPADM

## 2023-10-25 RX ORDER — LABETALOL HYDROCHLORIDE 5 MG/ML
10 INJECTION, SOLUTION INTRAVENOUS
Status: DISCONTINUED | OUTPATIENT
Start: 2023-10-25 | End: 2023-10-25 | Stop reason: HOSPADM

## 2023-10-25 RX ORDER — FENTANYL CITRATE 50 UG/ML
25 INJECTION, SOLUTION INTRAMUSCULAR; INTRAVENOUS EVERY 5 MIN PRN
Status: DISCONTINUED | OUTPATIENT
Start: 2023-10-25 | End: 2023-10-25 | Stop reason: HOSPADM

## 2023-10-25 RX ORDER — HEPARIN SODIUM 5000 [USP'U]/.5ML
5000 INJECTION, SOLUTION INTRAVENOUS; SUBCUTANEOUS EVERY 8 HOURS
Status: DISCONTINUED | OUTPATIENT
Start: 2023-10-26 | End: 2023-10-26 | Stop reason: HOSPADM

## 2023-10-25 RX ORDER — AMOXICILLIN 250 MG
1 CAPSULE ORAL 2 TIMES DAILY
Status: DISCONTINUED | OUTPATIENT
Start: 2023-10-25 | End: 2023-10-26 | Stop reason: HOSPADM

## 2023-10-25 RX ORDER — LIDOCAINE 40 MG/G
CREAM TOPICAL
Status: DISCONTINUED | OUTPATIENT
Start: 2023-10-25 | End: 2023-10-26 | Stop reason: HOSPADM

## 2023-10-25 RX ORDER — ONDANSETRON 2 MG/ML
4 INJECTION INTRAMUSCULAR; INTRAVENOUS EVERY 30 MIN PRN
Status: DISCONTINUED | OUTPATIENT
Start: 2023-10-25 | End: 2023-10-25 | Stop reason: HOSPADM

## 2023-10-25 RX ORDER — PROPOFOL 10 MG/ML
INJECTION, EMULSION INTRAVENOUS PRN
Status: DISCONTINUED | OUTPATIENT
Start: 2023-10-25 | End: 2023-10-25

## 2023-10-25 RX ORDER — SODIUM CHLORIDE, SODIUM LACTATE, POTASSIUM CHLORIDE, CALCIUM CHLORIDE 600; 310; 30; 20 MG/100ML; MG/100ML; MG/100ML; MG/100ML
INJECTION, SOLUTION INTRAVENOUS CONTINUOUS
Status: DISCONTINUED | OUTPATIENT
Start: 2023-10-25 | End: 2023-10-25 | Stop reason: HOSPADM

## 2023-10-25 RX ORDER — OXYCODONE HYDROCHLORIDE 5 MG/1
5 TABLET ORAL EVERY 4 HOURS PRN
Status: DISCONTINUED | OUTPATIENT
Start: 2023-10-25 | End: 2023-10-26 | Stop reason: HOSPADM

## 2023-10-25 RX ORDER — BISACODYL 10 MG
10 SUPPOSITORY, RECTAL RECTAL DAILY PRN
Status: DISCONTINUED | OUTPATIENT
Start: 2023-10-25 | End: 2023-10-26 | Stop reason: HOSPADM

## 2023-10-25 RX ORDER — DEXAMETHASONE SODIUM PHOSPHATE 10 MG/ML
INJECTION, SOLUTION INTRAMUSCULAR; INTRAVENOUS PRN
Status: DISCONTINUED | OUTPATIENT
Start: 2023-10-25 | End: 2023-10-25

## 2023-10-25 RX ORDER — ONDANSETRON 4 MG/1
4 TABLET, ORALLY DISINTEGRATING ORAL EVERY 30 MIN PRN
Status: CANCELLED | OUTPATIENT
Start: 2023-10-25

## 2023-10-25 RX ORDER — FENTANYL CITRATE 50 UG/ML
50 INJECTION, SOLUTION INTRAMUSCULAR; INTRAVENOUS EVERY 5 MIN PRN
Status: DISCONTINUED | OUTPATIENT
Start: 2023-10-25 | End: 2023-10-25 | Stop reason: HOSPADM

## 2023-10-25 RX ORDER — CALCIUM CARBONATE 500 MG/1
500-1000 TABLET, CHEWABLE ORAL DAILY PRN
Status: DISCONTINUED | OUTPATIENT
Start: 2023-10-25 | End: 2023-10-25 | Stop reason: HOSPADM

## 2023-10-25 RX ORDER — LIDOCAINE HYDROCHLORIDE 10 MG/ML
INJECTION, SOLUTION INFILTRATION; PERINEURAL PRN
Status: DISCONTINUED | OUTPATIENT
Start: 2023-10-25 | End: 2023-10-25

## 2023-10-25 RX ORDER — CEFAZOLIN SODIUM/WATER 2 G/20 ML
2 SYRINGE (ML) INTRAVENOUS
Status: COMPLETED | OUTPATIENT
Start: 2023-10-25 | End: 2023-10-25

## 2023-10-25 RX ORDER — LIDOCAINE HYDROCHLORIDE AND EPINEPHRINE 10; 10 MG/ML; UG/ML
INJECTION, SOLUTION INFILTRATION; PERINEURAL PRN
Status: DISCONTINUED | OUTPATIENT
Start: 2023-10-25 | End: 2023-10-25 | Stop reason: HOSPADM

## 2023-10-25 RX ORDER — SODIUM CHLORIDE, SODIUM LACTATE, POTASSIUM CHLORIDE, CALCIUM CHLORIDE 600; 310; 30; 20 MG/100ML; MG/100ML; MG/100ML; MG/100ML
INJECTION, SOLUTION INTRAVENOUS CONTINUOUS
Status: DISCONTINUED | OUTPATIENT
Start: 2023-10-25 | End: 2023-10-25

## 2023-10-25 RX ORDER — ONDANSETRON 4 MG/1
4 TABLET, ORALLY DISINTEGRATING ORAL EVERY 6 HOURS PRN
Status: DISCONTINUED | OUTPATIENT
Start: 2023-10-25 | End: 2023-10-26 | Stop reason: HOSPADM

## 2023-10-25 RX ORDER — SODIUM CHLORIDE, SODIUM LACTATE, POTASSIUM CHLORIDE, CALCIUM CHLORIDE 600; 310; 30; 20 MG/100ML; MG/100ML; MG/100ML; MG/100ML
INJECTION, SOLUTION INTRAVENOUS CONTINUOUS PRN
Status: DISCONTINUED | OUTPATIENT
Start: 2023-10-25 | End: 2023-10-25

## 2023-10-25 RX ORDER — SODIUM CHLORIDE, SODIUM LACTATE, POTASSIUM CHLORIDE, AND CALCIUM CHLORIDE .6; .31; .03; .02 G/100ML; G/100ML; G/100ML; G/100ML
IRRIGANT IRRIGATION PRN
Status: DISCONTINUED | OUTPATIENT
Start: 2023-10-25 | End: 2023-10-25 | Stop reason: HOSPADM

## 2023-10-25 RX ORDER — ACETAMINOPHEN 325 MG/1
975 TABLET ORAL EVERY 8 HOURS
Qty: 27 TABLET | Refills: 0 | Status: DISCONTINUED | OUTPATIENT
Start: 2023-10-25 | End: 2023-10-26 | Stop reason: HOSPADM

## 2023-10-25 RX ORDER — CEFAZOLIN SODIUM/WATER 2 G/20 ML
2 SYRINGE (ML) INTRAVENOUS SEE ADMIN INSTRUCTIONS
Status: DISCONTINUED | OUTPATIENT
Start: 2023-10-25 | End: 2023-10-26 | Stop reason: HOSPADM

## 2023-10-25 RX ORDER — ACETAMINOPHEN 325 MG/1
975 TABLET ORAL ONCE
Status: COMPLETED | OUTPATIENT
Start: 2023-10-25 | End: 2023-10-25

## 2023-10-25 RX ORDER — NALOXONE HYDROCHLORIDE 0.4 MG/ML
0.4 INJECTION, SOLUTION INTRAMUSCULAR; INTRAVENOUS; SUBCUTANEOUS
Status: DISCONTINUED | OUTPATIENT
Start: 2023-10-25 | End: 2023-10-26 | Stop reason: HOSPADM

## 2023-10-25 RX ORDER — ONDANSETRON 2 MG/ML
4 INJECTION INTRAMUSCULAR; INTRAVENOUS EVERY 30 MIN PRN
Status: CANCELLED | OUTPATIENT
Start: 2023-10-25

## 2023-10-25 RX ORDER — FENTANYL CITRATE 50 UG/ML
INJECTION, SOLUTION INTRAMUSCULAR; INTRAVENOUS PRN
Status: DISCONTINUED | OUTPATIENT
Start: 2023-10-25 | End: 2023-10-25

## 2023-10-25 RX ORDER — OXYCODONE HYDROCHLORIDE 5 MG/1
5 TABLET ORAL
Status: CANCELLED | OUTPATIENT
Start: 2023-10-25

## 2023-10-25 RX ORDER — ONDANSETRON 2 MG/ML
4 INJECTION INTRAMUSCULAR; INTRAVENOUS EVERY 6 HOURS PRN
Status: DISCONTINUED | OUTPATIENT
Start: 2023-10-25 | End: 2023-10-26 | Stop reason: HOSPADM

## 2023-10-25 RX ORDER — HYDROMORPHONE HCL IN WATER/PF 6 MG/30 ML
0.2 PATIENT CONTROLLED ANALGESIA SYRINGE INTRAVENOUS EVERY 5 MIN PRN
Status: DISCONTINUED | OUTPATIENT
Start: 2023-10-25 | End: 2023-10-25 | Stop reason: HOSPADM

## 2023-10-25 RX ORDER — ACETAMINOPHEN 325 MG/1
650 TABLET ORAL EVERY 4 HOURS PRN
Status: DISCONTINUED | OUTPATIENT
Start: 2023-10-28 | End: 2023-10-26 | Stop reason: HOSPADM

## 2023-10-25 RX ORDER — PROCHLORPERAZINE MALEATE 5 MG
5 TABLET ORAL EVERY 6 HOURS PRN
Status: DISCONTINUED | OUTPATIENT
Start: 2023-10-25 | End: 2023-10-26 | Stop reason: HOSPADM

## 2023-10-25 RX ORDER — HYDROMORPHONE HCL IN WATER/PF 6 MG/30 ML
0.4 PATIENT CONTROLLED ANALGESIA SYRINGE INTRAVENOUS EVERY 5 MIN PRN
Status: DISCONTINUED | OUTPATIENT
Start: 2023-10-25 | End: 2023-10-25 | Stop reason: HOSPADM

## 2023-10-25 RX ORDER — ONDANSETRON 4 MG/1
4 TABLET, ORALLY DISINTEGRATING ORAL EVERY 30 MIN PRN
Status: DISCONTINUED | OUTPATIENT
Start: 2023-10-25 | End: 2023-10-25 | Stop reason: HOSPADM

## 2023-10-25 RX ORDER — POLYETHYLENE GLYCOL 3350 17 G/17G
17 POWDER, FOR SOLUTION ORAL DAILY
Status: DISCONTINUED | OUTPATIENT
Start: 2023-10-26 | End: 2023-10-26 | Stop reason: HOSPADM

## 2023-10-25 RX ADMIN — DEXAMETHASONE SODIUM PHOSPHATE 10 MG: 10 INJECTION, SOLUTION INTRAMUSCULAR; INTRAVENOUS at 07:50

## 2023-10-25 RX ADMIN — PROPOFOL 100 MG: 10 INJECTION, EMULSION INTRAVENOUS at 07:32

## 2023-10-25 RX ADMIN — PHENYLEPHRINE HYDROCHLORIDE 100 MCG: 10 INJECTION INTRAVENOUS at 07:40

## 2023-10-25 RX ADMIN — ACETAMINOPHEN 975 MG: 325 TABLET ORAL at 22:10

## 2023-10-25 RX ADMIN — SODIUM CHLORIDE: 9 INJECTION, SOLUTION INTRAVENOUS at 07:51

## 2023-10-25 RX ADMIN — PIPERACILLIN AND TAZOBACTAM 3.38 G: 3; .375 INJECTION, POWDER, LYOPHILIZED, FOR SOLUTION INTRAVENOUS at 22:10

## 2023-10-25 RX ADMIN — ACETAMINOPHEN 975 MG: 325 TABLET ORAL at 06:51

## 2023-10-25 RX ADMIN — FENTANYL CITRATE 50 MCG: 50 INJECTION INTRAMUSCULAR; INTRAVENOUS at 07:32

## 2023-10-25 RX ADMIN — ACETAMINOPHEN 975 MG: 325 TABLET ORAL at 15:24

## 2023-10-25 RX ADMIN — SUGAMMADEX 200 MG: 100 INJECTION, SOLUTION INTRAVENOUS at 08:29

## 2023-10-25 RX ADMIN — PIPERACILLIN AND TAZOBACTAM 3.38 G: 3; .375 INJECTION, POWDER, LYOPHILIZED, FOR SOLUTION INTRAVENOUS at 14:54

## 2023-10-25 RX ADMIN — ROCURONIUM BROMIDE 30 MG: 10 INJECTION, SOLUTION INTRAVENOUS at 07:40

## 2023-10-25 RX ADMIN — ONDANSETRON 4 MG: 2 INJECTION INTRAMUSCULAR; INTRAVENOUS at 08:09

## 2023-10-25 RX ADMIN — FENTANYL CITRATE 50 MCG: 50 INJECTION INTRAMUSCULAR; INTRAVENOUS at 08:40

## 2023-10-25 RX ADMIN — Medication 70 MG: at 07:32

## 2023-10-25 RX ADMIN — SODIUM CHLORIDE, POTASSIUM CHLORIDE, SODIUM LACTATE AND CALCIUM CHLORIDE: 600; 310; 30; 20 INJECTION, SOLUTION INTRAVENOUS at 07:51

## 2023-10-25 RX ADMIN — PANTOPRAZOLE SODIUM 40 MG: 40 TABLET, DELAYED RELEASE ORAL at 17:30

## 2023-10-25 RX ADMIN — PROCHLORPERAZINE EDISYLATE 5 MG: 5 INJECTION INTRAMUSCULAR; INTRAVENOUS at 02:26

## 2023-10-25 RX ADMIN — SENNOSIDES AND DOCUSATE SODIUM 1 TABLET: 8.6; 5 TABLET ORAL at 22:11

## 2023-10-25 RX ADMIN — CALCIUM CARBONATE (ANTACID) CHEW TAB 500 MG 1000 MG: 500 CHEW TAB at 10:45

## 2023-10-25 RX ADMIN — HYDROMORPHONE HYDROCHLORIDE 0.2 MG: 1 INJECTION, SOLUTION INTRAMUSCULAR; INTRAVENOUS; SUBCUTANEOUS at 00:16

## 2023-10-25 RX ADMIN — PHENYLEPHRINE HYDROCHLORIDE 200 MCG: 10 INJECTION INTRAVENOUS at 07:45

## 2023-10-25 RX ADMIN — ONDANSETRON 4 MG: 2 INJECTION INTRAMUSCULAR; INTRAVENOUS at 00:55

## 2023-10-25 RX ADMIN — PHENYLEPHRINE HYDROCHLORIDE 0.5 MCG/KG/MIN: 10 INJECTION INTRAVENOUS at 07:45

## 2023-10-25 RX ADMIN — LIDOCAINE HYDROCHLORIDE 30 MG: 10 INJECTION, SOLUTION INFILTRATION; PERINEURAL at 07:32

## 2023-10-25 RX ADMIN — PROPOFOL 100 MG: 10 INJECTION, EMULSION INTRAVENOUS at 08:31

## 2023-10-25 RX ADMIN — Medication 2 G: at 07:27

## 2023-10-25 RX ADMIN — SENNOSIDES AND DOCUSATE SODIUM 1 TABLET: 8.6; 5 TABLET ORAL at 15:24

## 2023-10-25 RX ADMIN — PROPOFOL 100 MCG/KG/MIN: 10 INJECTION, EMULSION INTRAVENOUS at 07:40

## 2023-10-25 RX ADMIN — PIPERACILLIN AND TAZOBACTAM 3.38 G: 3; .375 INJECTION, POWDER, LYOPHILIZED, FOR SOLUTION INTRAVENOUS at 05:55

## 2023-10-25 ASSESSMENT — ACTIVITIES OF DAILY LIVING (ADL)
ADLS_ACUITY_SCORE: 37
ADLS_ACUITY_SCORE: 35
ADLS_ACUITY_SCORE: 37
ADLS_ACUITY_SCORE: 35
ADLS_ACUITY_SCORE: 37
ADLS_ACUITY_SCORE: 35

## 2023-10-25 NOTE — PROGRESS NOTES
[facility]    Medicine Progress Note - Hospitalist Service    Date of Admission:  10/24/2023    Assessment & Plan      Carmen Rowe is a 83 year old female admitted on 10/24/2023. She has a past medical history of hypertension, reflux, and known gallstone in the past, who was admitted with plan for cholecystectomy this evening.    On admission is hypertensive 186/77, labs notable for normal BMP, normal LFTs, anemia hemoglobin 10.9, platelets 368, CT chest abdomen pelvis demonstrates distended gallbladder 13 cm with large dependent gallstones but did not note wall thickening or edema, large hiatal hernia noted extensive colonic diverticulosis as well as bilateral adrenal nodules and evidence of prior granulomatous disease, and multiple cystic changes on the liver that is decreased compared to 2006.  General surgery is consulted.     Status post laparoscopic cholecystectomy by Dr. Smith with EBL 5 ccs. Seen postoperativley and she is hypoxic on 2L. Will check imaging and labs and covid19, bnp.    Disposition: likely tomorrow, pending improvement to hypoxia      ------  Hypoxia  A- could be atelectasis; CT on admit showed bronchiectasis in the lower lobes; will check more labs and pcr.  P:  - wean 02 as able  -Check procalcitonin, CXR and covid19  -Incentive spriometry  - check BNP    Cholelithiasis with right upper quadrant abdominal pain and nausea and vomiting  Known history of cholelithiasis  Clinically concerning for acute cholecystitis developing  Assessment: As above  Plan:  -Consult to general surgery, greatly appreciate  -Pain control nausea control  -Conservative Tylenol and for breakthrough we will have Dilaudid   - defer modification to surgery  - can stop fluids    Hypertension  Assessment: It is elevated in setting of pain on admission.  On a regimen of lisinopril.  Plan:   -Continue lisinopril  -We will have as needed hydralazine in the interim  -Pain control should help  "as well    Reflux  A/P-appears stable, could start Tums or Protonix if recurrent symptoms     Chronic dizziness  A/p- appears stable, continue meclizine          Diet: Clear Liquid Diet    DVT Prophylaxis: Pneumatic Compression Devices and Ambulate every shift  De Anda Catheter: Not present  Lines: None     Cardiac Monitoring: None  Code Status: Full Code      Clinically Significant Risk Factors Present on Admission                   # Hypertension: Noted on problem list      # Overweight: Estimated body mass index is 25.69 kg/m  as calculated from the following:    Height as of this encounter: 1.6 m (5' 3\").    Weight as of this encounter: 65.8 kg (145 lb).              Disposition Plan      Expected Discharge Date: 10/26/2023                    Marcos Dickey MD  Hospitalist Service  [facility]  Securely message with [employer] (more info)  Text page via [employer] Paging/Directory   ______________________________________________________________________    Interval History   - s/p errol martinez, seen postoperatively in PM   - no new symptoms  - pain controlled  - discussed hypoxia  -updated her daughter at bedside  -discussed w/ bedside PT gym RN    Physical Exam   Vital Signs: Temp: 99.8  F (37.7  C) Temp src: Oral BP: 131/74 Pulse: 74   Resp: 16 SpO2: 95 % O2 Device: Nasal cannula Oxygen Delivery: 2 LPM  Weight: 145 lbs 0 oz    General: alert, oriented, and in no acute distress  Pulmonary: clear to auscultation bilaterally, normal respiratory effort, on room air, no rales or wheezes or evidence of accessory muscle use  CVS: regular rate and rhythm, no murmurs, rubs, or gallops; no blatant jugular venous distention; no extremity edema and extremities are warm to the touch  GI: soft, nontender, 3 laparoscopic entry point sites are clean and some ecchymosis noted but no drainage or swelling or erythema; BS+, no rebound or guarding, no conspicuous organomegaly   Neuro: nonfocal, moves all extremities of " own volition  Psych: appropriate     Medical Decision Making       53 MINUTES SPENT BY ME on the date of service doing chart review, history, exam, documentation & further activities per the note.      Data   ------------------------- PAST 24 HR DATA REVIEWED -----------------------------------------------    I have personally reviewed the following data over the past 24 hrs:    12.2 (H)  \   9.7 (L)   / 323     138 109 (H) 20.1 /  116 (H)   4.1 20 (L) 0.97 (H) \     ALT: 49 AST: 107 (H) AP: 80 TBILI: 0.6   ALB: 3.6 TOT PROTEIN: 6.0 (L) LIPASE: N/A       Imaging results reviewed over the past 24 hrs:   No results found for this or any previous visit (from the past 24 hour(s)).

## 2023-10-25 NOTE — ANESTHESIA POSTPROCEDURE EVALUATION
Patient: Carmen Rowe    Procedure: Procedure(s):  CHOLECYSTECTOMY, LAPAROSCOPIC       Anesthesia Type:  General    Note:  Disposition: Inpatient   Postop Pain Control: Uneventful            Sign Out: Well controlled pain   PONV: No   Neuro/Psych: Uneventful            Sign Out: Acceptable/Baseline neuro status   Airway/Respiratory: Uneventful            Sign Out: Acceptable/Baseline resp. status   CV/Hemodynamics: Uneventful            Sign Out: Acceptable CV status; No obvious hypovolemia; No obvious fluid overload   Other NRE: NONE   DID A NON-ROUTINE EVENT OCCUR? No           Last vitals:  Vitals Value Taken Time   /66 10/25/23 1100   Temp 36.8  C (98.24  F) 10/25/23 0917   Pulse 74 10/25/23 1115   Resp 20 10/25/23 0930   SpO2 94 % 10/25/23 1115   Vitals shown include unfiled device data.    Electronically Signed By: Vladimir Ibarra MD  October 25, 2023  11:17 AM

## 2023-10-25 NOTE — ED NOTES
Pt report given to preop nurse at this time.  Pt to transport to surgery as soon as ED staff available for transport.  Marlyn Aguirre RN on 10/25/2023 at 6:15 AM

## 2023-10-25 NOTE — DISCHARGE INSTRUCTIONS
Follow up: please call us at [phone] to schedule an appointment at your convenience.     Diet: Regular diet. Patients can have difficulty with constipation following surgery, due in part to the administration of narcotic medications.  If you are suffering with constipation, you should avoid foods such as hard cheeses or red meat.  Foods high infiber are recommended.       Activity: You should continue to be active at home, including ambulating frequently.  If possible try to limit the amount of time spent in bed.     Restrictions: You have no liftingrestrictions post operatively, but may wish to avoid strenuous physical activity for 1-2 weeks.  You should limit your physical activity if it causes you discomfort; however, this should resolve within 1-2 weeks.   Walking does not count as strenuous physical activity.  You are safe to walk up and down stairs.  Following 2 weeks you may resume all normal physical activity.     Wound / drain care: Your incisions are closed using absorbale sutures.  The skin is sealed with a surgical glue.  Do not peal the glue off.  Please allow the glue to peal off on its own.      It is normal to have a smallrim of red present around the incisions. This should not, however, extend beyond 1/4 inch from the incision.  If your incisions become increasingly tender, red, or draining, please contact us.        Bathing: Youmay shower after 24 hours from surgery.  It is ok to get your incisions wet, but avoid rubbing them.  Avoid soaking in bath tubs, or swimming in lakes, pools, or streams for 4 weeks following surgery.

## 2023-10-25 NOTE — ANESTHESIA CARE TRANSFER NOTE
Patient: Carmen Rowe    Procedure: Procedure(s):  CHOLECYSTECTOMY, LAPAROSCOPIC       Diagnosis: Symptomatic cholelithiasis [K80.20]  Diagnosis Additional Information: No value filed.    Anesthesia Type:   General     Note:    Oropharynx: oropharynx clear of all foreign objects  Level of Consciousness: drowsy  Oxygen Supplementation: face mask  Level of Supplemental Oxygen (L/min / FiO2): 8  Independent Airway: airway patency satisfactory and stable  Dentition: dentition unchanged  Vital Signs Stable: post-procedure vital signs reviewed and stable  Report to RN Given: handoff report given  Patient transferred to: PACU    Handoff Report: Identifed the Patient, Identified the Reponsible Provider, Reviewed the pertinent medical history, Discussed the surgical course, Reviewed Intra-OP anesthesia mangement and issues during anesthesia, Set expectations for post-procedure period and Allowed opportunity for questions and acknowledgement of understanding      Vitals:  Vitals Value Taken Time   /66 10/25/23 0847   Temp 98.7f    Pulse 77 10/25/23 0847   Resp 20 10/25/23 0847   SpO2 98 % 10/25/23 0847   Vitals shown include unfiled device data.    Electronically Signed By: SERA LISA CRNA  October 25, 2023  8:49 AM

## 2023-10-25 NOTE — ANESTHESIA PREPROCEDURE EVALUATION
Anesthesia Pre-Procedure Evaluation    Patient: Carmen Rowe   MRN: [id number] : 1940        Procedure : Procedure(s):  CHOLECYSTECTOMY, LAPAROSCOPIC          History reviewed. No pertinent past medical history.   History reviewed. No pertinent surgical history.   Allergies   Allergen Reactions    Guaifenesin-Codeine Unknown     Other reaction(s): mouth swelling    Hydrocodone-Acetaminophen Unknown     Other reaction(s): nausea and vomiting      Social History     Tobacco Use    Smoking status: Never     Passive exposure: Never    Smokeless tobacco: Never   Substance Use Topics    Alcohol use: Never      Wt Readings from Last 1 Encounters:   10/24/23 65.8 kg (145 lb)        Anesthesia Evaluation            ROS/MED HX  ENT/Pulmonary:  - neg pulmonary ROS     Neurologic: Comment: L ICA aneurysm      Cardiovascular:     (+)  hypertension- -   -  - -                                   (-) murmur   METS/Exercise Tolerance:     Hematologic:       Musculoskeletal:       GI/Hepatic:     (+) GERD,         cholecystitis/cholelithiasis,          Renal/Genitourinary:       Endo:       Psychiatric/Substance Use:  - neg psychiatric ROS     Infectious Disease:       Malignancy:       Other:            Physical Exam    Airway  airway exam normal      Mallampati: II   TM distance: > 3 FB   Neck ROM: full   Mouth opening: > 3 cm    Respiratory Devices and Support         Dental       (+) Completely normal teeth      Cardiovascular   cardiovascular exam normal       Rhythm and rate: regular and normal (-) no murmur    Pulmonary   pulmonary exam normal        breath sounds clear to auscultation           OUTSIDE LABS:  CBC:   Lab Results   Component Value Date    WBC 12.2 (H) 10/25/2023    WBC 8.9 10/24/2023    HGB 9.7 (L) 10/25/2023    HGB 10.9 (L) 10/24/2023    HCT 31.2 (L) 10/25/2023    HCT 35.1 10/24/2023     10/25/2023     10/24/2023     BMP:   Lab Results   Component Value Date     10/24/2023     " (L) 05/14/2023    POTASSIUM 3.9 10/24/2023    POTASSIUM 3.7 05/14/2023    CHLORIDE 104 10/24/2023    CHLORIDE 106 05/14/2023    CO2 23 10/24/2023    CO2 21 (L) 05/14/2023    BUN 22.0 10/24/2023    BUN 20 05/14/2023    CR 0.95 10/24/2023    CR 0.97 05/14/2023     (H) 10/24/2023     05/14/2023     COAGS:   Lab Results   Component Value Date    PTT 34 05/14/2023    INR 1.01 05/14/2023     POC: No results found for: \"BGM\", \"HCG\", \"HCGS\"  HEPATIC:   Lab Results   Component Value Date    ALBUMIN 4.4 10/24/2023    PROTTOTAL 7.3 10/24/2023    ALT 12 10/24/2023    AST 19 10/24/2023    ALKPHOS 86 10/24/2023    BILITOTAL 0.3 10/24/2023     OTHER:   Lab Results   Component Value Date    LACT 1.1 03/23/2021    HAIR 9.5 10/24/2023    MAG 1.7 (L) 05/14/2023    LIPASE 38 10/24/2023    TSH 1.58 04/04/2019    SED 16 10/24/2023       Anesthesia Plan    ASA Status:  3    NPO Status:  NPO Appropriate    Anesthesia Type: General.     - Airway: ETT   Induction: Intravenous, RSI.   Maintenance: TIVA.        Consents    Anesthesia Plan(s) and associated risks, benefits, and realistic alternatives discussed. Questions answered and patient/representative(s) expressed understanding.     - Discussed: Risks, Benefits and Alternatives for BOTH SEDATION and the PROCEDURE were discussed     - Discussed with:  Patient            Postoperative Care    Pain management: IV analgesics, Oral pain medications.   PONV prophylaxis: Ondansetron (or other 5HT-3), Dexamethasone or Solumedrol, Background Propofol Infusion     Comments:                Vladimir Ibarra MD  "

## 2023-10-25 NOTE — OP NOTE
[facility]    Operative Note    Pre-operative diagnosis: Symptomatic cholelithiasis [K80.20]  Post-operative diagnosis Acute cholecystitis     Procedure: CHOLECYSTECTOMY, LAPAROSCOPIC, N/A - Abdomen    Surgeon: Surgeon(s) and Role:     * Gonzales Smith DO - Primary  Anesthesia: General   Estimated Blood Loss: 5 mL     Drains: None  Specimens:   ID Type Source Tests Collected by Time Destination   1 :  Tissue Gallbladder SURGICAL PATHOLOGY EXAM Gonzales Smith DO 10/25/2023  8:04 AM      Findings:     -Acute cholecystitis.  -Stone at the neck of the gallbladder    Complications: None.  Implants: * No implants in log *      Indication: 83-year-old female presenting with abdominal pain.  Patient was found to have acute cholecystitis with gallstones.  Offered patient procedure of laparoscopic cholecystectomy. The risks and benefits of the procedure were explained detail to the patient. The risks include infection, bleeding, damage to the surrounding structures. Patient verbalized understanding provided consent to undergo the procedure above.      Procedure: Patient was brought back to the operating room and was placed on the operating table in the supine position.  The patient's extremities were padded and positioned in the usual fashion.  The patient then underwent anesthesia sedation and intubation.  The patient's abdomen was prepped and draped in the usual sterile fashion.  Prior to initiating the procedure, a timeout was completed.  All present were in agreement.    1% lidocaine with epinephrine was instilled in Franz's point over the left upper quadrant.  An 11 blade was then used to make a small skin incision at Franz's point.  A Veress needle was then inserted into the patient's abdomen.  A saline drop test was then completed.  Insufflation was then initiated.  A 5 mm trocar was inserted at the infraumbilical port site with a Visiport.  Once insufflation was completed, a general survey  was completed.  I could identify that the patient had an inflamed gallbladder and it was distended..    A 12 mm port was placed in the epigastric region.  Two 5 mm ports were placed in the right upper quadrant.  These ports were placed under direct visualization.  The suction  puncture device was used to decompress the gallbladder.  The gallbladder was then grasped with gator graspers and retracted cephalad.  The cystic duct and cystic artery were then carefully dissected and isolated with a combination of blunt dissection with the [place] graspers as well as electrocautery.  Once the cystic artery and cystic duct were isolated the critical view was obtained.  An Endo Clip was used to clip across the cystic artery and duct.  Endoscopic shelley were then used to transect across the cystic duct and cystic artery.  The gallbladder was removed off of the liver bed using electrocautery.  The gallbladder was then removed from the abdomen using an Endo Catch bag through the 12 mm epigastric port site.      Inspection of the liver bed revealed good hemostasis.  The fascia of the 12 mm port site was then closed using an 0 Vicryl suture with a suture passer.  A 3-0 Vicryl suture was used to perform deep dermal sutures at the 12 mm port site.  All surgical sites were then closed with a 4-0 Vicryl suture in a subcuticular fashion.  Surgical glue was used to reinforce all surgical skin incisions.  At the end of the procedure, a final count was completed.  I was told all sharps, sponges, instruments were accounted for.  The patient tolerated the procedure with no complications.  The patient was then extubated and brought back to the PACU in stable condition.    Mae Brewster was present to assist myself in the surgical case.  Her assistance was required for exposure and visualization, leading to decreased operating time, and decreased blood loss.        Gonzales Smith DO  General Surgeon  [facility]  Clinic - [address]  [place] [address]?  Office: [phone]  Employed by - [facility]  Pager: [phone]

## 2023-10-25 NOTE — ANESTHESIA PROCEDURE NOTES
Airway       Patient location during procedure: OR       Procedure Start/Stop Times: 10/25/2023 7:35 AM  Staff -        Anesthesiologist:  Vladimir Ibarra MD       CRNA: Ileana Harris APRN CRNA       Performed By: CRNA  Consent for Airway        Urgency: elective  Indications and Patient Condition       Indications for airway management: dillon-procedural and airway protection       Induction type:intravenous       Mask difficulty assessment: 2 - vent by mask + OA or adjuvant +/- NMBA    Final Airway Details       Final airway type: endotracheal airway    Endotracheal Airway Details        Successful intubation technique: video laryngoscopy       VL Blade Size: Glidescope 3       Grade View of Cords: 1       Adjucts: stylet       Position: Right       Measured from: lips       Secured at (cm): 22       Bite block used: None    Post intubation assessment        Placement verified by: capnometry, equal breath sounds and chest rise        Number of attempts at approach: 2 (Failed X 1 with Corona 2  Blade, DL)       Number of other approaches attempted: 0       Secured with: commercial tube lang and silk tape       Ease of procedure: easy       Dentition: Intact and Unchanged       Dental guard used and removed. Dental Guard Type: Proguard Red.    Medication(s) Administered   Medication Administration Time: 10/25/2023 7:35 AM

## 2023-10-26 VITALS
BODY MASS INDEX: 25.69 KG/M2 | DIASTOLIC BLOOD PRESSURE: 68 MMHG | RESPIRATION RATE: 16 BRPM | HEART RATE: 63 BPM | SYSTOLIC BLOOD PRESSURE: 117 MMHG | TEMPERATURE: 97.8 F | HEIGHT: 63 IN | OXYGEN SATURATION: 93 % | WEIGHT: 145 LBS

## 2023-10-26 LAB
ANION GAP SERPL CALCULATED.3IONS-SCNC: 9 MMOL/L (ref 7–15)
BUN SERPL-MCNC: 17.8 MG/DL (ref 8–23)
CALCIUM SERPL-MCNC: 9.2 MG/DL (ref 8.8–10.2)
CHLORIDE SERPL-SCNC: 107 MMOL/L (ref 98–107)
CREAT SERPL-MCNC: 0.98 MG/DL (ref 0.51–0.95)
DEPRECATED HCO3 PLAS-SCNC: 19 MMOL/L (ref 22–29)
EGFRCR SERPLBLD CKD-EPI 2021: 57 ML/MIN/1.73M2
ERYTHROCYTE [DISTWIDTH] IN BLOOD BY AUTOMATED COUNT: 15.6 % (ref 10–15)
GLUCOSE BLDC GLUCOMTR-MCNC: 121 MG/DL (ref 70–99)
GLUCOSE SERPL-MCNC: 108 MG/DL (ref 70–99)
HCT VFR BLD AUTO: 25.8 % (ref 35–47)
HGB BLD-MCNC: 8.1 G/DL (ref 11.7–15.7)
HGB BLD-MCNC: 9 G/DL (ref 11.7–15.7)
MAGNESIUM SERPL-MCNC: 2.1 MG/DL (ref 1.7–2.3)
MCH RBC QN AUTO: 26.1 PG (ref 26.5–33)
MCHC RBC AUTO-ENTMCNC: 31.4 G/DL (ref 31.5–36.5)
MCV RBC AUTO: 83 FL (ref 78–100)
PATH REPORT.COMMENTS IMP SPEC: NORMAL
PATH REPORT.COMMENTS IMP SPEC: NORMAL
PATH REPORT.FINAL DX SPEC: NORMAL
PATH REPORT.GROSS SPEC: NORMAL
PATH REPORT.MICROSCOPIC SPEC OTHER STN: NORMAL
PATH REPORT.RELEVANT HX SPEC: NORMAL
PHOSPHATE SERPL-MCNC: 2.6 MG/DL (ref 2.5–4.5)
PHOTO IMAGE: NORMAL
PLATELET # BLD AUTO: 275 10E3/UL (ref 150–450)
POTASSIUM SERPL-SCNC: 3.7 MMOL/L (ref 3.4–5.3)
RBC # BLD AUTO: 3.1 10E6/UL (ref 3.8–5.2)
SODIUM SERPL-SCNC: 135 MMOL/L (ref 135–145)
WBC # BLD AUTO: 11.9 10E3/UL (ref 4–11)

## 2023-10-26 PROCEDURE — 96372 THER/PROPH/DIAG INJ SC/IM: CPT | Performed by: SURGERY

## 2023-10-26 PROCEDURE — 80048 BASIC METABOLIC PNL TOTAL CA: CPT | Performed by: SURGERY

## 2023-10-26 PROCEDURE — 250N000013 HC RX MED GY IP 250 OP 250 PS 637: Performed by: SURGERY

## 2023-10-26 PROCEDURE — 36415 COLL VENOUS BLD VENIPUNCTURE: CPT | Performed by: STUDENT IN AN ORGANIZED HEALTH CARE EDUCATION/TRAINING PROGRAM

## 2023-10-26 PROCEDURE — 82962 GLUCOSE BLOOD TEST: CPT

## 2023-10-26 PROCEDURE — 85027 COMPLETE CBC AUTOMATED: CPT | Performed by: SURGERY

## 2023-10-26 PROCEDURE — 250N000009 HC RX 250: Performed by: STUDENT IN AN ORGANIZED HEALTH CARE EDUCATION/TRAINING PROGRAM

## 2023-10-26 PROCEDURE — 250N000013 HC RX MED GY IP 250 OP 250 PS 637: Performed by: STUDENT IN AN ORGANIZED HEALTH CARE EDUCATION/TRAINING PROGRAM

## 2023-10-26 PROCEDURE — 99024 POSTOP FOLLOW-UP VISIT: CPT | Performed by: PHYSICIAN ASSISTANT

## 2023-10-26 PROCEDURE — 250N000011 HC RX IP 250 OP 636: Mod: JZ | Performed by: SURGERY

## 2023-10-26 PROCEDURE — 85018 HEMOGLOBIN: CPT | Performed by: STUDENT IN AN ORGANIZED HEALTH CARE EDUCATION/TRAINING PROGRAM

## 2023-10-26 PROCEDURE — 83735 ASSAY OF MAGNESIUM: CPT | Performed by: SURGERY

## 2023-10-26 PROCEDURE — 84100 ASSAY OF PHOSPHORUS: CPT | Performed by: SURGERY

## 2023-10-26 PROCEDURE — G0378 HOSPITAL OBSERVATION PER HR: HCPCS

## 2023-10-26 PROCEDURE — 96376 TX/PRO/DX INJ SAME DRUG ADON: CPT

## 2023-10-26 PROCEDURE — 99239 HOSP IP/OBS DSCHRG MGMT >30: CPT | Performed by: STUDENT IN AN ORGANIZED HEALTH CARE EDUCATION/TRAINING PROGRAM

## 2023-10-26 PROCEDURE — 88304 TISSUE EXAM BY PATHOLOGIST: CPT | Mod: 26 | Performed by: PATHOLOGY

## 2023-10-26 PROCEDURE — 36415 COLL VENOUS BLD VENIPUNCTURE: CPT | Performed by: SURGERY

## 2023-10-26 RX ADMIN — PANTOPRAZOLE SODIUM 40 MG: 40 TABLET, DELAYED RELEASE ORAL at 07:19

## 2023-10-26 RX ADMIN — ACETAMINOPHEN 975 MG: 325 TABLET ORAL at 07:10

## 2023-10-26 RX ADMIN — PIPERACILLIN AND TAZOBACTAM 3.38 G: 3; .375 INJECTION, POWDER, LYOPHILIZED, FOR SOLUTION INTRAVENOUS at 07:10

## 2023-10-26 RX ADMIN — LISINOPRIL 10 MG: 10 TABLET ORAL at 08:36

## 2023-10-26 RX ADMIN — SENNOSIDES AND DOCUSATE SODIUM 1 TABLET: 8.6; 5 TABLET ORAL at 08:36

## 2023-10-26 RX ADMIN — HEPARIN SODIUM 5000 UNITS: 5000 INJECTION, SOLUTION INTRAVENOUS; SUBCUTANEOUS at 03:45

## 2023-10-26 ASSESSMENT — ACTIVITIES OF DAILY LIVING (ADL)
ADLS_ACUITY_SCORE: 37
DEPENDENT_IADLS:: INDEPENDENT
ADLS_ACUITY_SCORE: 37

## 2023-10-26 NOTE — PROGRESS NOTES
"PRIMARY DIAGNOSIS: \"GENERIC\" NURSING  OUTPATIENT/OBSERVATION GOALS TO BE MET BEFORE DISCHARGE:  ADLs back to baseline: No, needs assist with ADLs    Activity and level of assistance: Up with standby assistance.    Pain status: Improved-controlled with oral pain medications.    Return to near baseline physical activity: No     Pt titrated off of O2, with adequate oxygen sats. Diet advanced to regular, tolerated without increased pain or nausea. Pt denies pain. Calls appropriately.  "

## 2023-10-26 NOTE — PLAN OF CARE
Problem: Adult Inpatient Plan of Care  Goal: Absence of Hospital-Acquired Illness or Injury  Outcome: Adequate for Care Transition     A&Ox4. Denies pain. Eating and voiding appropriately. Pt to discharge after abx finish and Hgb is drawn.

## 2023-10-26 NOTE — PLAN OF CARE
PRIMARY DIAGNOSIS: ACUTE PAIN  OUTPATIENT/OBSERVATION GOALS TO BE MET BEFORE DISCHARGE:  1. Pain Status: Improved-controlled with oral pain medications.    2. Return to near baseline physical activity: Yes    3. Cleared for discharge by consultants (if involved): Yes    Discharge Planner Nurse   Safe discharge environment identified: Yes  Barriers to discharge: Yes       Entered by: Natalia Lima RN 10/26/2023 12:43 AM     Please review provider order for any additional goals.   Nurse to notify provider when observation goals have been met and patient is ready for discharge.  Goal Outcome Evaluation:    Pt is alert and oriented x4. VSS on RA. Denied any pain, N/V, SOB, Chest pain, or HA. Ax1/SBA with ambulation and ADLs. Incision sites are CDI and MAYI. Discharge plans are for home 10/26/23.     Pt was observed snoring at 0512 hours, O2 dipped to 85% for a second and went immediately back up to 92%. Continued to monitor.

## 2023-10-26 NOTE — PROGRESS NOTES
General Surgery Progress Note:    Hospital Day # 0    ASSESSMENT:  1. Symptomatic cholelithiasis    2. Biliary colic        Carmen Rowe is a 83 year old female who is s/p laparoscopic cholecystectomy for acute cholecystitis on 10/25/23 with Dr. Smith. Patient doing well post-operatively. Pain adequately controlled. Tolerating a regular diet without issues. Okay to discharge from a surgical standpoint.    PLAN:  - Regular diet as tolerated  - Continue to encourage ambulation to promote bowel function  - Okay for discharge from a surgical standpoint when deemed medically appropriate  - Discharge instructions and follow-up recommendations placed in AVS    SUBJECTIVE:   She is feeling well. Pain controlled. Tolerating a regular diet without nausea or vomiting. No return of bowel function yet. Ambulating and voiding independently.      Patient Vitals for the past 24 hrs:   BP Temp Temp src Pulse Resp SpO2   10/26/23 0809 117/68 97.8  F (36.6  C) Oral 63 16 93 %   10/26/23 0513 -- -- -- -- -- 92 %   10/26/23 0512 -- -- -- -- -- (!) 85 %   10/26/23 0348 126/76 98.1  F (36.7  C) Oral 68 16 92 %   10/25/23 2330 113/60 98.5  F (36.9  C) Oral 67 16 93 %   10/25/23 1930 122/69 98.2  F (36.8  C) Oral 70 16 94 %   10/25/23 1530 128/59 97.9  F (36.6  C) Oral 68 16 95 %   10/25/23 1430 128/60 98.6  F (37  C) Oral 69 18 94 %   10/25/23 1330 121/60 98.2  F (36.8  C) Oral 70 18 93 %   10/25/23 1300 131/74 99.8  F (37.7  C) Oral 74 16 95 %   10/25/23 1230 (!) 149/83 98.5  F (36.9  C) Oral 69 16 94 %   10/25/23 1200 136/64 -- -- 77 16 96 %   10/25/23 1130 131/63 -- -- 74 16 93 %   10/25/23 1100 120/66 -- -- 74 16 94 %   10/25/23 1030 126/64 -- -- 75 16 93 %   10/25/23 1000 127/61 -- -- 72 16 93 %   10/25/23 0930 110/56 -- -- 74 20 95 %         PHYSICAL EXAM:  General: patient seen resting in bed, no acute distress  Resp: no respiratory distress, breathing comfortably on room air  Abdomen: Soft, minimally tender to palpation over  incisions. Incisions clean/dry/intact with overlying Dermabond and mild surround ecchymosis.   Extremities: warm and well perfused    10/25 0700 - 10/26 0659  In: 1478.19 [P.O.:750; I.V.:724.19]  Out: 60     Admission on 10/24/2023   Component Date Value    Systolic Blood Pressure 10/24/2023 142     Diastolic Blood Pressure 10/24/2023 89     Ventricular Rate 10/24/2023 63     Atrial Rate 10/24/2023 63     IL Interval 10/24/2023 214     QRS Duration 10/24/2023 70     QT 10/24/2023 386     QTc 10/24/2023 395     P Axis 10/24/2023 18     R AXIS 10/24/2023 6     T Axis 10/24/2023 9     Interpretation ECG 10/24/2023                      Value:Sinus rhythm with 1st degree A-V block  Low voltage QRS  Borderline ECG  When compared with ECG of 14-MAY-2023 21:32,  No significant change was found  Confirmed by SEE ED PROVIDER NOTE FOR, ECG INTERPRETATION ([id number]),  CRIS HANEY ([id number]) on 10/24/2023 9:23:55 AM      Protein Total 10/24/2023 7.3     Albumin 10/24/2023 4.4     Bilirubin Total 10/24/2023 0.3     Alkaline Phosphatase 10/24/2023 86     AST 10/24/2023 19     ALT 10/24/2023 12     Bilirubin Direct 10/24/2023 <0.20     Lipase 10/24/2023 38     Sodium 10/24/2023 138     Potassium 10/24/2023 3.9     Chloride 10/24/2023 104     Carbon Dioxide (CO2) 10/24/2023 23     Anion Gap 10/24/2023 11     Urea Nitrogen 10/24/2023 22.0     Creatinine 10/24/2023 0.95     GFR Estimate 10/24/2023 59 (L)     Calcium 10/24/2023 9.5     Glucose 10/24/2023 122 (H)     Troponin T, High Sensiti* 10/24/2023 8     WBC Count 10/24/2023 8.9     RBC Count 10/24/2023 4.16     Hemoglobin 10/24/2023 10.9 (L)     Hematocrit 10/24/2023 35.1     MCV 10/24/2023 84     MCH 10/24/2023 26.2 (L)     MCHC 10/24/2023 31.1 (L)     RDW 10/24/2023 15.1 (H)     Platelet Count 10/24/2023 368     % Neutrophils 10/24/2023 80     % Lymphocytes 10/24/2023 15     % Monocytes 10/24/2023 4     % Eosinophils 10/24/2023 1     % Basophils 10/24/2023 0     % Immature  Granulocytes 10/24/2023 0     NRBCs per 100 WBC 10/24/2023 0     Absolute Neutrophils 10/24/2023 7.1     Absolute Lymphocytes 10/24/2023 1.3     Absolute Monocytes 10/24/2023 0.4     Absolute Eosinophils 10/24/2023 0.1     Absolute Basophils 10/24/2023 0.0     Absolute Immature Granul* 10/24/2023 0.0     Absolute NRBCs 10/24/2023 0.0     Erythrocyte Sedimentatio* 10/24/2023 16     Sodium 10/25/2023 138     Potassium 10/25/2023 4.1     Chloride 10/25/2023 109 (H)     Carbon Dioxide (CO2) 10/25/2023 20 (L)     Anion Gap 10/25/2023 9     Urea Nitrogen 10/25/2023 20.1     Creatinine 10/25/2023 0.97 (H)     GFR Estimate 10/25/2023 57 (L)     Calcium 10/25/2023 8.5 (L)     Glucose 10/25/2023 116 (H)     Protein Total 10/25/2023 6.0 (L)     Albumin 10/25/2023 3.6     Bilirubin Total 10/25/2023 0.6     Alkaline Phosphatase 10/25/2023 80     AST 10/25/2023 107 (H)     ALT 10/25/2023 49     Bilirubin Direct 10/25/2023 <0.20     WBC Count 10/25/2023 12.2 (H)     RBC Count 10/25/2023 3.73 (L)     Hemoglobin 10/25/2023 9.7 (L)     Hematocrit 10/25/2023 31.2 (L)     MCV 10/25/2023 84     MCH 10/25/2023 26.0 (L)     MCHC 10/25/2023 31.1 (L)     RDW 10/25/2023 15.4 (H)     Platelet Count 10/25/2023 311     Platelet Count 10/25/2023 323     SARS CoV2 PCR 10/25/2023 Negative     Procalcitonin 10/25/2023 0.11 (H)     N terminal Pro BNP Inpat* 10/25/2023 681     Sodium 10/26/2023 135     Potassium 10/26/2023 3.7     Chloride 10/26/2023 107     Carbon Dioxide (CO2) 10/26/2023 19 (L)     Anion Gap 10/26/2023 9     Urea Nitrogen 10/26/2023 17.8     Creatinine 10/26/2023 0.98 (H)     GFR Estimate 10/26/2023 57 (L)     Calcium 10/26/2023 9.2     Glucose 10/26/2023 108 (H)     Magnesium 10/26/2023 2.1     Phosphorus 10/26/2023 2.6     WBC Count 10/26/2023 11.9 (H)     RBC Count 10/26/2023 3.10 (L)     Hemoglobin 10/26/2023 8.1 (L)     Hematocrit 10/26/2023 25.8 (L)     MCV 10/26/2023 83     MCH 10/26/2023 26.1 (L)     MCHC 10/26/2023 31.4  (L)     RDW 10/26/2023 15.6 (H)     Platelet Count 10/26/2023 275     GLUCOSE BY METER POCT 10/26/2023 121 (H)         Mae Brewster PA-C  [facility]  [address] [place] [id number]

## 2023-10-26 NOTE — DISCHARGE SUMMARY
"[facility]  Hospitalist Discharge Summary      Date of Admission:  10/24/2023  Date of Discharge:  10/26/2023  1:33 PM  Discharging Provider: Marcos Dickey MD  Discharge Service: Hospitalist Service    Discharge Diagnoses   Symptomatic cholelithiasis  Biliary colic  Hypoxia which improved, CT demonstrated bronchiectasis    Clinically Significant Risk Factors     # Overweight: Estimated body mass index is 25.69 kg/m  as calculated from the following:    Height as of this encounter: 1.6 m (5' 3\").    Weight as of this encounter: 65.8 kg (145 lb).       Follow-ups Needed After Discharge   Follow-up Appointments     Follow-up and recommended labs and tests       Follow up with primary care provider, Mae Farley, within 7 days   for hospital follow- up.  The following labs/tests are recommended: cbc.   Consider sleep medicine.             Unresulted Labs Ordered in the Past 30 Days of this Admission       Date and Time Order Name Status Description    10/25/2023  8:20 AM Surgical Pathology Exam In process         These results will be followed up by pcp or surgery or whs- whomever sees first.     Discharge Disposition   Discharged to home  Condition at discharge: Stable    Hospital Course   Carmen Rowe is a 83 year old female admitted on 10/24/2023. She has a past medical history of hypertension, reflux, and known gallstone in the past, who was admitted with plan for cholecystectomy this evening.    On admission is hypertensive 186/77, labs notable for normal BMP, normal LFTs, anemia hemoglobin 10.9, platelets 368, CT chest abdomen pelvis demonstrates distended gallbladder 13 cm with large dependent gallstones but did not note wall thickening or edema, large hiatal hernia noted extensive colonic diverticulosis as well as bilateral adrenal nodules and evidence of prior granulomatous disease, and multiple cystic changes on the liver that is decreased compared to 2006.  General " surgery is consulted.     Status post laparoscopic cholecystectomy by Dr. Smith with EBL 5 ccs. Seen postoperativley and she was hypoxic on 2L. No acute findings on imaging and labs and covid19 was negative and nonelevated bnp.     She was weaned off 02 by 10/26/2023 and noted with transient nighttime hypoxia. As such, she will be discharged to back home in stable condition and should follow up with pcp within a week for a post hospital visit and also consider follow up w/ sleep medicine (repeat study?). Surgical pathology pending on discharge.      Consultations This Hospital Stay   SURGERY GENERAL IP CONSULT  CARE MANAGEMENT / SOCIAL WORK IP CONSULT    Code Status   Full Code    Time Spent on this Encounter   I, Marcos Dickey MD, personally saw the patient today and spent greater than 30 minutes discharging this patient.       Marcos Dickey MD  [address] [address]  Phone: [phone]  Fax: [phone]  ______________________________________________________________________    Physical Exam   Vital Signs: Temp: 97.8  F (36.6  C) Temp src: Oral BP: 117/68 Pulse: 63   Resp: 16 SpO2: 93 % O2 Device: None (Room air) Oxygen Delivery: 1.5 LPM  Weight: 145 lbs 0 oz    General: alert, oriented, and in no acute distress  Pulmonary: clear to auscultation bilaterally, normal respiratory effort, on room air, no rales or wheezes or evidence of accessory muscle use  CVS: regular rate and rhythm, no murmurs, rubs, or gallops; no blatant jugular venous distention; no extremity edema and extremities are warm to the touch  GI: soft, nontender, all her laparoscopic entry point sites remain clean and stable ecchymosis noted but no drainage or swelling or erythema; BS+, no rebound or guarding, no conspicuous organomegaly   Neuro: nonfocal, moves all extremities of own volition  Psych: appropriate        Primary Care Physician   Mae Farley    Discharge  Orders      Reason for your hospital stay    Acute cholecystitis or inflamed gallbladder. Follow up with primary care doctor within a week and consider sleep medicine in the future.     Follow-up and recommended labs and tests     Follow up with primary care provider, Mae Farley, within 7 days for hospital follow- up.  The following labs/tests are recommended: cbc. Consider sleep medicine.     Activity    Your activity upon discharge: activity as tolerated     Diet    Follow this diet upon discharge: Orders Placed This Encounter      Regular Diet Adult       Significant Results and Procedures   Results for orders placed or performed during the hospital encounter of 10/24/23   CT Chest/Abdomen/Pelvis w Contrast    Narrative    EXAM: CT CHEST/ABDOMEN/PELVIS W CONTRAST  LOCATION: [facility]  DATE: 10/24/2023    INDICATION: Awoke with stabbing right upper quadrant pain with nausea.  COMPARISON: CTA CAP 02/23/2021, CT AP 07/19/2006  TECHNIQUE: CT scan of the chest, abdomen, and pelvis was performed following injection of IV contrast. Multiplanar reformats were obtained. Dose reduction techniques were used.   CONTRAST: Isovue 370 71mL    FINDINGS:   LUNGS AND PLEURA: Mild cylindrical bronchiectasis in the lower lobes. No retained secretions. Calcified granuloma in the right upper lobe and small fissural nodule in the right midlung are stable.    MEDIASTINUM/AXILLAE: Large hiatal hernia with a partially intrathoracic stomach again noted. Upper esophagus is mildly distended with fluid. Aorta and branches are normal. No central pulmonary emboli    CORONARY ARTERY CALCIFICATION: Moderate.    HEPATOBILIARY: Gallbladder is distended to 13 cm. There is a large dependent stone within the gallbladder neck. No wall thickening or pericholecystic stranding. Multiple cystic changes are seen within the liver. Largest is a small conglomeration of cysts   superiorly in segment 4A with a few punctate  dystrophic calcification within it. These have clearly decreased in size since 2006. No suspicious liver lesions.    PANCREAS: Normal.    SPLEEN: Calcified granulomas in the spleen.    ADRENAL GLANDS: Nodular thickening of both adrenal glands, left greater than right with the left adrenal nodule measuring 1.3 cm.    KIDNEYS/BLADDER: Normal.    BOWEL: Quite redundant colon with diffuse colonic diverticulosis. No inflammatory changes. Bowel is of normal caliber.    LYMPH NODES: There are a few less than 1 cm upper para-aortic nodes, unchanged..    VASCULATURE: Extensive arterial calcifications with ectatic aorta. No aneurysm. Vessels are patent.    PELVIC ORGANS: Normal.    MUSCULOSKELETAL: Moderate facet arthropathy L3-L5. No suspicious bone lesions. No fractures.      Impression    IMPRESSION:  1.  Gallbladder is distended to 13 cm and has a large dependent gallstone. No wall thickening or pericholecystic fluid. Gallbladder ultrasound can assess this further if there is concern for an early cholecystitis.  2.  Large hiatal hernia with a partially intrathoracic stomach again noted with fluid within the esophagus, presumed due to reflux.  3.  Extensive colonic diverticulosis without evidence of diverticulitis.  4.  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 These have developed since 2006. If there is a clinical need  to further evaluate these, noncontrast adrenal CT can be done in the future.  5.  Evidence of previous granulomatous disease.  6.  Multiple cystic changes in the liver, decreased in size since 2006.  7.  Other noncritical findings as noted above.   XR Chest Port 1 View    Narrative    EXAM: XR CHEST PORT 1 VIEW  LOCATION: [facility]  DATE: 10/25/2023    INDICATION: hypoxia, please assess for any pathology, thank you. recent CT showed bronchiectasis in the lower lobes.  COMPARISON: 10/24/2023 CT.      Impression    IMPRESSION: Nothing for pneumonia. Heart size  mildly enlarged. Moderate size hiatal hernia behind the heart.       Discharge Medications   Discharge Medication List as of 10/26/2023  9:04 AM        CONTINUE these medications which have NOT CHANGED    Details   acetaminophen (TYLENOL) 500 MG tablet Take 500-1,000 mg by mouth every 6 hours as needed for mild pain, Historical      glycopyrrolate (ROBINUL) 1 MG tablet Take 1 mg by mouth nightly as needed (Excessive Secretions), Historical      lisinopril (ZESTRIL) 10 MG tablet Take 10 mg by mouth daily, Historical      meclizine (ANTIVERT) 25 MG tablet Take 0.5 tablets (12.5 mg) by mouth 3 times daily as needed for dizziness or nausea, Disp-12 tablet, R-0, Local Print           Allergies   Allergies   Allergen Reactions    Guaifenesin-Codeine Unknown     Other reaction(s): mouth swelling    Hydrocodone-Acetaminophen Unknown     Other reaction(s): nausea and vomiting

## 2023-10-26 NOTE — CONSULTS
Care Management Initial Consult    General Information  Assessment completed with: VM-chart review,    Type of CM/SW Visit: Chart Assessment    Primary Care Provider verified and updated as needed: Yes   Readmission within the last 30 days: no previous admission in last 30 days      Reason for Consult: discharge planning  Advance Care Planning:     no ACP docs        Communication Assessment  Patient's communication style: spoken language (English or Bilingual)          Cognitive  Cognitive/Neuro/Behavioral: WDL                      Living Environment:   People in home: spouse, other (see comments)     Current living Arrangements: house      Able to return to prior arrangements: yes       Family/Social Support:  Care provided by: self  Provides care for: no one  Marital Status:   , Children          Description of Support System: Supportive, Involved         Current Resources:   Patient receiving home care services: No     Community Resources: None  Equipment currently used at home:    Supplies currently used at home: None    Employment/Financial:  Employment Status:          Financial Concerns: none   Referral to Financial Worker: No      Lifestyle & Psychosocial Needs:  Social Determinants of Health     Food Insecurity: Not on file   Depression: Not at risk (7/11/2023)    PHQ-2     PHQ-2 Score: 0   Housing Stability: Not on file   Tobacco Use: Low Risk  (10/25/2023)    Patient History     Smoking Tobacco Use: Never     Smokeless Tobacco Use: Never     Passive Exposure: Never   Financial Resource Strain: Not on file   Alcohol Use: Not on file   Transportation Needs: Not on file   Physical Activity: Not on file   Interpersonal Safety: Not on file   Stress: Not on file   Social Connections: Not on file       Functional Status:  Prior to admission patient needed assistance:   Dependent ADLs:: Independent  Dependent IADLs:: Independent       Mental Health Status:  Mental Health Status: No Current Concerns        Chemical Dependency Status:  Chemical Dependency Status: No Current Concerns             Values/Beliefs:  Spiritual, Cultural Beliefs, Mormon Practices, Values that affect care: no               Additional Information:  SW introduced self and CM role to Pt. Pt lives in a house with her .  Pt reports independence at baseline with all I/ADLs.  Pt reports positive support from her  and children who live in the area.  Discussed observation status and provided RIVAS.   will transport at discharge.    will transport at discharge.     JACKIE Sheehan

## 2024-07-09 ENCOUNTER — TELEPHONE (OUTPATIENT)
Dept: NEUROSURGERY | Facility: CLINIC | Age: 84
End: 2024-07-09
Payer: MEDICARE

## 2024-07-09 NOTE — TELEPHONE ENCOUNTER
Left Voicemail (1st Attempt) for the patient to call back and schedule the following:    Appointment type: Rtn vascular neurosurg - virtual  Provider: Dr. Stuart  Return date: Aug. 2024  Specialty phone number: [phone]  Additional appointment(s) needed: N/A  Additonal Notes: Send link to: [phone] one year follow up with imaging prior

## 2024-07-11 ENCOUNTER — HOSPITAL ENCOUNTER (OUTPATIENT)
Dept: MRI IMAGING | Facility: CLINIC | Age: 84
Discharge: HOME | End: 2024-07-11
Attending: RADIOLOGY | Admitting: RADIOLOGY
Payer: MEDICARE

## 2024-07-11 DIAGNOSIS — I67.1 CEREBRAL ANEURYSM, NONRUPTURED: ICD-10-CM

## 2024-07-11 PROCEDURE — 70544 MR ANGIOGRAPHY HEAD W/O DYE: CPT | Mod: MG

## 2024-08-20 ENCOUNTER — VIRTUAL VISIT (OUTPATIENT)
Dept: NEUROSURGERY | Facility: CLINIC | Age: 84
End: 2024-08-20
Payer: MEDICARE

## 2024-08-20 VITALS — HEIGHT: 63 IN | WEIGHT: 140 LBS | BODY MASS INDEX: 24.8 KG/M2

## 2024-08-20 DIAGNOSIS — I67.1 CEREBRAL ANEURYSM, NONRUPTURED: Primary | ICD-10-CM

## 2024-08-20 PROCEDURE — 99202 OFFICE O/P NEW SF 15 MIN: CPT | Mod: 95 | Performed by: RADIOLOGY

## 2024-08-20 ASSESSMENT — PAIN SCALES - GENERAL: PAINLEVEL: NO PAIN (0)

## 2024-08-20 NOTE — NURSING NOTE
[place] [facility] [facility] [address]    Is the patient currently in the state of [place]? YES    Visit mode:VIDEO    If the visit is dropped, the patient can be reconnected by: VIDEO VISIT: Text to cell phone:   Telephone Information:   Mobile [phone]       Will anyone else be joining the visit? NO  (If patient encounters technical issues they should call [phone]    How would you like to obtain your AVS? Mail a copy    Are changes needed to the allergy or medication list? No    Are refills needed on medications prescribed by this physician? NO    Rooming Documentation:  Not applicable      Reason for visit: ARACELIS MARTINOF

## 2024-08-20 NOTE — PROGRESS NOTES
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 This was incidentally discovered when patient presented to ED in May 2023 with episodes of vertigo. She underwent MRI MRA to rule out posterior circulation stroke. MRI brain was negative for acute stroke, but the MRA demonstrated left internal carotid artery mid cavernous segment aneurysm measuring 7 x 6 mm. Patient does not have any family history of subarachnoid hemorrhage or aneurysm, she does not have any personal history of aneurysm or subarachnoid hemorrhage.     Past Medical History:  Past Medical History:   Diagnosis Date    Arthritis     Complication of anesthesia     History of blood transfusion     Hypertension     Motion sickness     PONV (postoperative nausea and vomiting)        Past Surgical History:  Past Surgical History:   Procedure Laterality Date    LAPAROSCOPIC CHOLECYSTECTOMY N/A 10/25/2023    Procedure: CHOLECYSTECTOMY, LAPAROSCOPIC;  Surgeon: Gonzales Smith DO;  Location: [facility] Main OR       Social History:  Social History     Socioeconomic History    Marital status:      Spouse name: Not on file    Number of children: Not on file    Years of education: Not on file    Highest education level: Not on file   Occupational History    Not on file   Tobacco Use    Smoking status: Never     Passive exposure: Never    Smokeless tobacco: Never   Substance and Sexual Activity    Alcohol use: Never    Drug use: Never    Sexual activity: Not on file   Other Topics Concern    Not on file   Social History Narrative    Not on file     Social Determinants of Health      Financial Resource Strain: Not on file   Food Insecurity: Not on file   Transportation Needs: Not on file   Physical Activity: Not on file   Stress: Not on file   Social Connections: Not on file   Interpersonal Safety: Not on file   Housing Stability: Not on file       Family History:  No family history on file.    Home Medications:  Current Outpatient Medications   Medication Sig Dispense Refill    acetaminophen (TYLENOL) 500 MG tablet Take 500-1,000 mg by mouth every 6 hours as needed for mild pain      lisinopril (ZESTRIL) 10 MG tablet Take 10 mg by mouth daily      meclizine (ANTIVERT) 25 MG tablet Take 0.5 tablets (12.5 mg) by mouth 3 times daily as needed for dizziness or nausea 12 tablet 0    glycopyrrolate (ROBINUL) 1 MG tablet Take 1 mg by mouth nightly as needed (Excessive Secretions)       No current facility-administered medications for this visit.       Allergies:  Allergies   Allergen Reactions    Guaifenesin-Codeine Unknown     Other reaction(s): mouth swelling    Hydrocodone-Acetaminophen Unknown     Other reaction(s): nausea and vomiting         Impression:  Stable left cavernous 6x6x4  mm aneurysm  No concerning compression symptoms    Plan:  Repeat MRA imaging in 2 years from now    Hattie Kumar MD   ESN Fellow  Pager: [phone]

## 2024-08-20 NOTE — LETTER
8/20/2024       RE: Carmen Rowe  [address]  [place] [address]     Dear Colleague,    Thank you for referring your patient, Carmen Rowe, to the [facility] NEUROSURGERY CLINIC [place] at [facility]. Please see a copy of my visit note below.          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 This was incidentally discovered when patient presented to ED in May 2023 with episodes of vertigo. She underwent MRI MRA to rule out posterior circulation stroke. MRI brain was negative for acute stroke, but the MRA demonstrated left internal carotid artery mid cavernous segment aneurysm measuring 7 x 6 mm. Patient does not have any family history of subarachnoid hemorrhage or aneurysm, she does not have any personal history of aneurysm or subarachnoid hemorrhage.     Past Medical History:  Past Medical History:   Diagnosis Date     Arthritis      Complication of anesthesia      History of blood transfusion      Hypertension      Motion sickness      PONV (postoperative nausea and vomiting)        Past Surgical History:  Past Surgical History:   Procedure Laterality Date     LAPAROSCOPIC CHOLECYSTECTOMY N/A 10/25/2023    Procedure: CHOLECYSTECTOMY, LAPAROSCOPIC;  Surgeon: Gonzales Smith DO;  Location: [facility] Main OR       Social History:  Social History     Socioeconomic History     Marital status:      Spouse name: Not on file     Number of children: Not on file     Years of education: Not on file     Highest education level: Not on file   Occupational  History     Not on file   Tobacco Use     Smoking status: Never     Passive exposure: Never     Smokeless tobacco: Never   Substance and Sexual Activity     Alcohol use: Never     Drug use: Never     Sexual activity: Not on file   Other Topics Concern     Not on file   Social History Narrative     Not on file     Social Determinants of Health     Financial Resource Strain: Not on file   Food Insecurity: Not on file   Transportation Needs: Not on file   Physical Activity: Not on file   Stress: Not on file   Social Connections: Not on file   Interpersonal Safety: Not on file   Housing Stability: Not on file       Family History:  No family history on file.    Home Medications:  Current Outpatient Medications   Medication Sig Dispense Refill     acetaminophen (TYLENOL) 500 MG tablet Take 500-1,000 mg by mouth every 6 hours as needed for mild pain       lisinopril (ZESTRIL) 10 MG tablet Take 10 mg by mouth daily       meclizine (ANTIVERT) 25 MG tablet Take 0.5 tablets (12.5 mg) by mouth 3 times daily as needed for dizziness or nausea 12 tablet 0     glycopyrrolate (ROBINUL) 1 MG tablet Take 1 mg by mouth nightly as needed (Excessive Secretions)       No current facility-administered medications for this visit.       Allergies:  Allergies   Allergen Reactions     Guaifenesin-Codeine Unknown     Other reaction(s): mouth swelling     Hydrocodone-Acetaminophen Unknown     Other reaction(s): nausea and vomiting         Impression:  Stable left cavernous 6x6x4  mm aneurysm  No concerning compression symptoms    Plan:  Repeat MRA imaging in 2 years from now    Hattie Kumar MD   ESN Fellow  Pager: [phone]      Attestation signed by Domingo Stuart MD at 8/21/2024  8:22 AM:  I have personally spoken with and evaluated the patient on August 20, 2024  and I agree with the note by Dr. Kumar              On August 21, 2024      Again, thank you for allowing me to participate in the care of your patient.       Sincerely,    Domingo Stuart MD

## 2024-08-21 NOTE — PATIENT INSTRUCTIONS
Follow-up with Dr. Stuart in 2 years with an MRA prior to your appointment.    Stroke & Endovascular RN Care Coordinators:    Jeni Parmar, RN, BSN  Linda Hunter, RN, CNRN, SCRN    If you have any questions please contact the RN Care Coordinators at [phone], option 1.    After business hours call the  at [phone] and have the Neuro-Interventional Fellow paged.    Thank you for choosing [facility] for your health care needs.

## 2024-12-11 ENCOUNTER — LAB REQUISITION (OUTPATIENT)
Dept: LAB | Facility: CLINIC | Age: 84
End: 2024-12-11
Payer: MEDICARE

## 2024-12-11 DIAGNOSIS — I10 ESSENTIAL (PRIMARY) HYPERTENSION: ICD-10-CM

## 2024-12-11 LAB
ANION GAP SERPL CALCULATED.3IONS-SCNC: 9 MMOL/L (ref 7–15)
BUN SERPL-MCNC: 20 MG/DL (ref 8–23)
CALCIUM SERPL-MCNC: 9.4 MG/DL (ref 8.8–10.4)
CHLORIDE SERPL-SCNC: 103 MMOL/L (ref 98–107)
CREAT SERPL-MCNC: 1.08 MG/DL (ref 0.51–0.95)
EGFRCR SERPLBLD CKD-EPI 2021: 50 ML/MIN/1.73M2
GLUCOSE SERPL-MCNC: 98 MG/DL (ref 70–99)
HCO3 SERPL-SCNC: 24 MMOL/L (ref 22–29)
POTASSIUM SERPL-SCNC: 4.5 MMOL/L (ref 3.4–5.3)
SODIUM SERPL-SCNC: 136 MMOL/L (ref 135–145)

## 2025-04-23 ENCOUNTER — HOSPITAL ENCOUNTER (EMERGENCY)
Facility: CLINIC | Age: 85
Discharge: HOME | End: 2025-04-23
Payer: MEDICARE

## 2025-04-23 VITALS
HEART RATE: 66 BPM | HEIGHT: 64 IN | DIASTOLIC BLOOD PRESSURE: 81 MMHG | OXYGEN SATURATION: 94 % | BODY MASS INDEX: 25.44 KG/M2 | WEIGHT: 149 LBS | TEMPERATURE: 97.6 F | RESPIRATION RATE: 16 BRPM | SYSTOLIC BLOOD PRESSURE: 174 MMHG

## 2025-04-23 DIAGNOSIS — R26.0 ATAXIC GAIT: ICD-10-CM

## 2025-04-23 LAB
ANION GAP SERPL CALCULATED.3IONS-SCNC: 10 MMOL/L (ref 7–15)
BASOPHILS # BLD AUTO: 0 10E3/UL (ref 0–0.2)
BASOPHILS NFR BLD AUTO: 0 %
BUN SERPL-MCNC: 23 MG/DL (ref 8–23)
CALCIUM SERPL-MCNC: 9.5 MG/DL (ref 8.8–10.4)
CHLORIDE SERPL-SCNC: 103 MMOL/L (ref 98–107)
CREAT SERPL-MCNC: 0.97 MG/DL (ref 0.51–0.95)
EGFRCR SERPLBLD CKD-EPI 2021: 57 ML/MIN/1.73M2
EOSINOPHIL # BLD AUTO: 0.2 10E3/UL (ref 0–0.7)
EOSINOPHIL NFR BLD AUTO: 3 %
ERYTHROCYTE [DISTWIDTH] IN BLOOD BY AUTOMATED COUNT: 14.2 % (ref 10–15)
GLUCOSE SERPL-MCNC: 98 MG/DL (ref 70–99)
HCO3 SERPL-SCNC: 23 MMOL/L (ref 22–29)
HCT VFR BLD AUTO: 41.6 % (ref 35–47)
HGB BLD-MCNC: 14.3 G/DL (ref 11.7–15.7)
IMM GRANULOCYTES # BLD: 0 10E3/UL
IMM GRANULOCYTES NFR BLD: 0 %
LYMPHOCYTES # BLD AUTO: 2.1 10E3/UL (ref 0.8–5.3)
LYMPHOCYTES NFR BLD AUTO: 27 %
MCH RBC QN AUTO: 30.4 PG (ref 26.5–33)
MCHC RBC AUTO-ENTMCNC: 34.4 G/DL (ref 31.5–36.5)
MCV RBC AUTO: 88 FL (ref 78–100)
MONOCYTES # BLD AUTO: 0.8 10E3/UL (ref 0–1.3)
MONOCYTES NFR BLD AUTO: 10 %
NEUTROPHILS # BLD AUTO: 4.6 10E3/UL (ref 1.6–8.3)
NEUTROPHILS NFR BLD AUTO: 59 %
NRBC # BLD AUTO: 0 10E3/UL
NRBC BLD AUTO-RTO: 0 /100
PLATELET # BLD AUTO: 334 10E3/UL (ref 150–450)
POTASSIUM SERPL-SCNC: 4.8 MMOL/L (ref 3.4–5.3)
RBC # BLD AUTO: 4.71 10E6/UL (ref 3.8–5.2)
SODIUM SERPL-SCNC: 136 MMOL/L (ref 135–145)
WBC # BLD AUTO: 7.7 10E3/UL (ref 4–11)

## 2025-04-23 PROCEDURE — 99285 EMERGENCY DEPT VISIT HI MDM: CPT | Mod: 25

## 2025-04-23 PROCEDURE — 36415 COLL VENOUS BLD VENIPUNCTURE: CPT

## 2025-04-23 PROCEDURE — 255N000002 HC RX 255 OP 636

## 2025-04-23 PROCEDURE — 85025 COMPLETE CBC W/AUTO DIFF WBC: CPT

## 2025-04-23 PROCEDURE — 80048 BASIC METABOLIC PNL TOTAL CA: CPT

## 2025-04-23 PROCEDURE — A9585 GADOBUTROL INJECTION: HCPCS

## 2025-04-23 RX ORDER — GADOBUTROL 604.72 MG/ML
9.5 INJECTION INTRAVENOUS ONCE
Status: COMPLETED | OUTPATIENT
Start: 2025-04-23 | End: 2025-04-23

## 2025-04-23 RX ADMIN — GADOBUTROL 9.5 ML: 604.72 INJECTION INTRAVENOUS at 11:52

## 2025-04-23 ASSESSMENT — COLUMBIA-SUICIDE SEVERITY RATING SCALE - C-SSRS
2. HAVE YOU ACTUALLY HAD ANY THOUGHTS OF KILLING YOURSELF IN THE PAST MONTH?: NO
6. HAVE YOU EVER DONE ANYTHING, STARTED TO DO ANYTHING, OR PREPARED TO DO ANYTHING TO END YOUR LIFE?: NO
1. IN THE PAST MONTH, HAVE YOU WISHED YOU WERE DEAD OR WISHED YOU COULD GO TO SLEEP AND NOT WAKE UP?: NO

## 2025-04-23 ASSESSMENT — ACTIVITIES OF DAILY LIVING (ADL)
ADLS_ACUITY_SCORE: 49

## 2025-04-23 NOTE — ED TRIAGE NOTES
"Pt arrives to ED with c/o difficulty finding words a few days ago while talking to he , those symptoms have now resolved. Pt states last night she went out to dinner and when she left dinner around 1730 states she was off balance \"like I was drunk\". States those symptoms are also resolved. No hx of stroke but states known  aneurysm in her brain.      Triage Assessment (Adult)       Row Name 04/23/25 0946          Triage Assessment    Airway WDL WDL        Respiratory WDL    Respiratory WDL WDL        Skin Circulation/Temperature WDL    Skin Circulation/Temperature WDL WDL        Cardiac WDL    Cardiac WDL WDL        Peripheral/Neurovascular WDL    Peripheral Neurovascular WDL WDL        Cognitive/Neuro/Behavioral WDL    Cognitive/Neuro/Behavioral WDL WDL                     "

## 2025-04-23 NOTE — ED PROVIDER NOTES
"EMERGENCY DEPARTMENT ENCOUNTER      NAME: Carmen Rowe  AGE: 84 year old female  YOB: 1940  MRN: [id number]  EVALUATION DATE & TIME: 4/23/2025  9:48 AM    PCP: Mae Farley    ED PROVIDER: Sander Mejia MD    FINAL IMPRESSION:  1. Ataxic gait        ED COURSE & MEDICAL DECISION MAKING:    Pertinent Labs & Imaging studies reviewed. (See chart for details)  84 year old female presents to the Emergency Department for evaluation of aphasia and gait disturbance.  Differential diagnosis considered Alcohol intoxication, alcohol withdrawal, electrolyte disturbance, hepatic encephalopathy, hypertensive encephalopathy, hypoglycemia, hyperglycemia, opioid overdose, uremia, traumatic blood loss, toxic ingestion, brain tumor, thyrotoxicosis, sepsis, polypharmacy, psychiatric disturbance, seizure, ischemic stroke, hemorrhagic stroke.     Triage Note: Pt arrives to ED with c/o difficulty finding words a few days ago while talking to he , those symptoms have now resolved. Pt states last night she went out to dinner and when she left dinner around 1730 states she was off balance \"like I was drunk\". States those symptoms are also resolved. No hx of stroke but states known  aneurysm in her brain.           ED Course as of 04/23/25 1332   Wed Apr 23, 2025   0954 I met with the patient to obtain patient history and performed a physical exam. Discussed plan for ED work up including potential diagnostic studies and interventions.  Patient with known aneurysm presenting with reading difficulties and word difficulties \"a few days ago\".  The symptoms have resolved.  Last known normal approximately 1730.  Left the restaurant was walking like she was \"drunk\".  Had gait ataxia which is new for her.  Did not fall.  Did not have vertiginous symptoms vomiting or confusion at that time.  Resolved with rest.  No history of new medications or peripheral neuropathy.  No new symptoms.  Currently asymptomatic with " NIH of 0.  No code stroke called.  I deferred CTA given absence of symptoms.  Does have a history of aneurysm and has what seems to be posterior circulation correlating symptoms and well obtain a MRI and MRA.  No sudden onset headache and doubt subarachnoid hemorrhage that requires CTA emergently.  Patient is likely well-appearing and nontoxic.  No ataxia currently.   No weakness or numbness her lower extremities and doubt Guillain-Barré or spinal epidural abscess, conus medullaris or cauda equina syndrome.  MRI for disposition.   1124 Basic metabolic panel(!)  Unremarkable   1124 CBC with platelets differential  No leukocytosis or anemia   1124 Currently had MRI   1249 MR Brain w/o & w Contrast  CONCLUSION:  HEAD MRI:   1.  No evidence of an acute intracranial abnormality.  2.  Mild age-related change.     HEAD MRA:   1.  No evidence of proximal large vessel occlusion or flow-limiting stenosis.  2.  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     NECK MRA:  1.  No hemodynamically significant stenosis based on NASCET criteria.  2.  No evidence for dissection or pseudoaneurysm.     1300 Patient still asymptomatic.  Is hypertensive but has a history of hypertension.  Is been taking her blood pressures.  Do not believe this is hypertensive urgency or emergency as she is asymptomatic.  MRI is negative.  Will follow-up with her primary care provider.  Patient agreeable with plan.       Not Applicable    I discussed the plan for discharge with the patient and patient is agreeable. We discussed supportive cares at home and reasons to return to the ER including new or worsening symptoms. All questions and concerns addressed to the best of my ability. Strict return precautions discussed. Patient to be discharge by RN.    At the conclusion of the encounter I discussed the results of the tests and the disposition. The questions were answered. The patient or family acknowledged understanding and was agreeable with the care plan.  "    MEDICATIONS GIVEN IN THE EMERGENCY:  Medications   gadobutrol (GADAVIST) injection 9.5 mL (9.5 mLs Intravenous $Given 4/23/25 1159)       NEW PRESCRIPTIONS STARTED AT TODAY'S ER VISIT  Discharge Medication List as of 4/23/2025  1:01 PM        Discharge Medication List as of 4/23/2025  1:01 PM          =================================================================    HPI    Carmen Rowe is a 84 year old female with a pertinent history of brain aneurysm who presents to this ED for evaluation of aphasia and gait disturbance    The patient reports that a couple of days ago, she was reading a text message and notes that she was not able to read the whole message completely and took multiple attempts throughout the day to fully read the message. She also reports that at 5pm on 4/22/25, she went out to eat and after coming out of the restaurant, she started to feel off balance and \"all over the place\" , having trouble walking. Notes that when she was sitting in place when she got home, her symptoms subsided. She notes that she was fine this morning 4/22. She currently denies any onset of headache, vomiting, nausea, spinning, new medications, numbness in arms/legs, speech changes, or vision loss. Of note, patient reports this is the first time having such symptoms and she also reports a history of a small brain aneurysm that was due to migraines. There were no other concerns/complaints at this time.     Use of : NA    PHYSICAL EXAM    BP (!) 174/81   Pulse 66   Temp 97.6  F (36.4  C) (Oral)   Resp 16   Ht 1.626 m (5' 4\")   Wt 67.6 kg (149 lb)   SpO2 94%   BMI 25.58 kg/m    Constitutional: Comfortable appearing.  Head: Normocephalic, atraumatic, mucous membranes moist, nose normal.   Neck: Supple, gross ROM intact.   Eyes: Pupils mid-range, sclera white.  Respiratory: Clear to auscultation bilaterally, no respiratory distress, no wheezing, speaks full sentences easily.  Cardiovascular: Normal " heart rate, regular rhythm, no murmurs. No lower extremity edema.   GI: Soft, no tenderness to deep palpation in all quadrants.  Musculoskeletal: Moving all 4 extremities intentionally and without pain. No obvious deformity.  Skin: Warm, dry, no rash.  Neuro:  Alert and oriented, CN II-XII grossly intact, speech clear. 5/5 strength in upper and lower extremities. Sensation to light touch intact in all 4 extremities. No pronator drift. No dysmetria with finger to nose. Gait without ataxia.  Psychiatric: Affect normal, cooperative.    National [employer] Stroke Scale  Exam Interval: Baseline   Score    Level of consciousness: (0)   Alert, keenly responsive    LOC questions: (0)   Answers both questions correctly    LOC commands: (0)   Performs both tasks correctly    Best gaze: (0)   Normal    Visual: (0)   No visual loss    Facial palsy: (0)   Normal symmetrical movements    Motor arm (left): (0)   No drift    Motor arm (right): (0)   No drift    Motor leg (left): (0)   No drift    Motor leg (right): (0)   No drift    Limb ataxia: (0)   Absent    Sensory: (0)   Normal- no sensory loss    Best language: (0)   Normal- no aphasia    Dysarthria: (0)   Normal    Extinction and inattention: (0)   No abnormality        Total Score:  0        LAB:  All pertinent labs reviewed and interpreted.  Results for orders placed or performed during the hospital encounter of 04/23/25   MR Brain w/o & w Contrast    Impression    CONCLUSION:  HEAD MRI:   1.  No evidence of an acute intracranial abnormality.  2.  Mild age-related change.    HEAD MRA:   1.  No evidence of proximal large vessel occlusion or flow-limiting stenosis.  2.  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    NECK MRA:  1.  No hemodynamically significant stenosis based on NASCET criteria.  2.  No evidence for dissection or pseudoaneurysm.     MRA Brain ([place]) wo Contrast    Impression    CONCLUSION:  HEAD MRI:   1.  No evidence of an acute  intracranial abnormality.  2.  Mild age-related change.    HEAD MRA:   1.  No evidence of proximal large vessel occlusion or flow-limiting stenosis.  2.  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    NECK MRA:  1.  No hemodynamically significant stenosis based on NASCET criteria.  2.  No evidence for dissection or pseudoaneurysm.     MRA Neck (Carotids) wo & w Contrast    Impression    CONCLUSION:  HEAD MRI:   1.  No evidence of an acute intracranial abnormality.  2.  Mild age-related change.    HEAD MRA:   1.  No evidence of proximal large vessel occlusion or flow-limiting stenosis.  2.  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    NECK MRA:  1.  No hemodynamically significant stenosis based on NASCET criteria.  2.  No evidence for dissection or pseudoaneurysm.     Basic metabolic panel   Result Value Ref Range    Sodium 136 135 - 145 mmol/L    Potassium 4.8 3.4 - 5.3 mmol/L    Chloride 103 98 - 107 mmol/L    Carbon Dioxide (CO2) 23 22 - 29 mmol/L    Anion Gap 10 7 - 15 mmol/L    Urea Nitrogen 23.0 8.0 - 23.0 mg/dL    Creatinine 0.97 (H) 0.51 - 0.95 mg/dL    GFR Estimate 57 (L) >60 mL/min/1.73m2    Calcium 9.5 8.8 - 10.4 mg/dL    Glucose 98 70 - 99 mg/dL   CBC with platelets and differential   Result Value Ref Range    WBC Count 7.7 4.0 - 11.0 10e3/uL    RBC Count 4.71 3.80 - 5.20 10e6/uL    Hemoglobin 14.3 11.7 - 15.7 g/dL    Hematocrit 41.6 35.0 - 47.0 %    MCV 88 78 - 100 fL    MCH 30.4 26.5 - 33.0 pg    MCHC 34.4 31.5 - 36.5 g/dL    RDW 14.2 10.0 - 15.0 %    Platelet Count 334 150 - 450 10e3/uL    % Neutrophils 59 %    % Lymphocytes 27 %    % Monocytes 10 %    % Eosinophils 3 %    % Basophils 0 %    % Immature Granulocytes 0 %    NRBCs per 100 WBC 0 <1 /100    Absolute Neutrophils 4.6 1.6 - 8.3 10e3/uL    Absolute Lymphocytes 2.1 0.8 - 5.3 10e3/uL    Absolute Monocytes 0.8 0.0 - 1.3 10e3/uL    Absolute Eosinophils 0.2 0.0 - 0.7 10e3/uL    Absolute Basophils 0.0 0.0 - 0.2 10e3/uL    Absolute Immature Granulocytes  0.0 <=0.4 10e3/uL    Absolute NRBCs 0.0 10e3/uL       RADIOLOGY:  Reviewed all pertinent imaging. Please see official radiology report.  MRA Neck (Carotids) wo & w Contrast   Final Result   CONCLUSION:   HEAD MRI:    1.  No evidence of an acute intracranial abnormality.   2.  Mild age-related change.      HEAD MRA:    1.  No evidence of proximal large vessel occlusion or flow-limiting stenosis.   2.  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      NECK MRA:   1.  No hemodynamically significant stenosis based on NASCET criteria.   2.  No evidence for dissection or pseudoaneurysm.         MRA Brain ([place]) wo Contrast   Final Result   CONCLUSION:   HEAD MRI:    1.  No evidence of an acute intracranial abnormality.   2.  Mild age-related change.      HEAD MRA:    1.  No evidence of proximal large vessel occlusion or flow-limiting stenosis.   2.  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      NECK MRA:   1.  No hemodynamically significant stenosis based on NASCET criteria.   2.  No evidence for dissection or pseudoaneurysm.         MR Brain w/o & w Contrast   Final Result   CONCLUSION:   HEAD MRI:    1.  No evidence of an acute intracranial abnormality.   2.  Mild age-related change.      HEAD MRA:    1.  No evidence of proximal large vessel occlusion or flow-limiting stenosis.   2.  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      NECK MRA:   1.  No hemodynamically significant stenosis based on NASCET criteria.   2.  No evidence for dissection or pseudoaneurysm.                   Sander Mejia MD  [facility] EMERGENCY ROOM  [address] [facility] [address] [phone]   =================================================================    BILLING:  Data  Category 1  Non-ED record review, if applicable. External record reviewed: N/A     Clinical information was obtained from an independent historian. History was obtained from: Patient and Son provided additional  information in the HPI     The following testing was considered but ultimately not selected after discussion with patient/family: N/A     Category 2  My independent interpretation of EKG, rhythm strip, radiology study: MRI of brain did not reveal large tumor     Category 3  Discussion of management with other physician/healthcare provider/other source: N/A       Risk  Prescription medication was considered, but ultimately not given after discussion with patient/family: N/A     Chronic conditions affecting care: Hypertension and GERD, Liver Disease, Brain aneurysm, cataracts     Consideration of Admission/Observation: Escalation of care including admission/observation was considered given the complexity and risk of the patient's presenting complaint, exam findings, and/or their underlying comorbidities. However, ultimately I feel the patient is safe for outpatient management with close follow up. Reasoning: Work-up reassuring, does not reveal any acute life/organ threatening processes, patient's symptoms well controlled upon reevaluation, reexamination is reassuring, vitals are stable, patient agreeable with discharge, reliable for follow-up.     Considered admission for serial reassessments however patient is asymptomatic.  MRI brain imaging was negative.  Believe she can follow-up in the outpatient setting.        I, Aleksandar Macdonald, am serving as a scribe to document services personally performed by Sander Mejia MD based on my observation and the provider's statements to me. I, Sander Mejia MD, attest that Aleksandar Macdonald is acting in a scribe capacity, has observed my performance of the services and has documented them in accordance with my direction.     Sander Mejia MD  04/23/25 3727

## (undated) DEVICE — A3 SUPPLIES- SEE NURSING INFO PAGE

## (undated) DEVICE — SOL WATER IRRIG 1000ML BOTTLE 2F7114

## (undated) DEVICE — SYR 30ML LL W/O NDL 302832

## (undated) DEVICE — ESU PENCIL SMOKE EVAC W/ROCKER SWITCH 0703-047-000

## (undated) DEVICE — DECANTER VIAL 2006S

## (undated) DEVICE — SU DERMABOND ADVANCED .7ML DNX12

## (undated) DEVICE — TIP CAUTERY L HOOK 36CM E377336C

## (undated) DEVICE — ENDO TROCAR FIRST ENTRY KII FIOS Z-THRD 05X100MM CTF03

## (undated) DEVICE — ENDO TROCAR SLEEVE KII Z-THREADED 05X100MM CTS02

## (undated) DEVICE — SU VICRYL+ 0 27 UR6 VLT VCP603H

## (undated) DEVICE — SYSTEM LAPAROVUE VISIBILITY LAPVUE10

## (undated) DEVICE — BLADE KNIFE SURG 11 371111

## (undated) DEVICE — CLIP APPLIER ENDO ROTATING 10MM MED/LG ER320

## (undated) DEVICE — ENDO TROCAR FIRST ENTRY KII FIOS Z-THRD 11X100MM CTF33

## (undated) DEVICE — SU VICRYL+ 3-0 27IN SH UND VCP416H

## (undated) DEVICE — ENDO SHEARS RENEW LAP ENDOCUT SCISSOR TIP 16.5MM 3142

## (undated) DEVICE — SUTURE VICRYL+ 4-0 UNDYED PS-2 VCP496H

## (undated) DEVICE — GLOVE UNDER INDICATOR PI SZ 7.0 LF 41670

## (undated) DEVICE — PREP CHLORAPREP 26ML TINTED HI-LITE ORANGE 930815

## (undated) DEVICE — ENDO POUCH UNIV RETRIEVAL SYSTEM INZII 10MM CD001

## (undated) DEVICE — PLATE GROUNDING ADULT W/CORD 9165L

## (undated) DEVICE — CUSTOM PACK LAP CHOLE SBA5BLCHEA

## (undated) DEVICE — TUBING SMOKE EVAC PNEUMOCLEAR HIGH FLOW 0620050250

## (undated) DEVICE — NDL INSUFFLATION 13GA 120MM C2201

## (undated) DEVICE — SUCTION STRYKERFLOW II 250-070-500

## (undated) RX ORDER — ONDANSETRON 2 MG/ML
INJECTION INTRAMUSCULAR; INTRAVENOUS
Status: DISPENSED
Start: 2023-10-25

## (undated) RX ORDER — PROPOFOL 10 MG/ML
INJECTION, EMULSION INTRAVENOUS
Status: DISPENSED
Start: 2023-10-25

## (undated) RX ORDER — DEXAMETHASONE SODIUM PHOSPHATE 10 MG/ML
INJECTION, EMULSION INTRAMUSCULAR; INTRAVENOUS
Status: DISPENSED
Start: 2023-10-25

## (undated) RX ORDER — LIDOCAINE HYDROCHLORIDE 10 MG/ML
INJECTION, SOLUTION EPIDURAL; INFILTRATION; INTRACAUDAL; PERINEURAL
Status: DISPENSED
Start: 2023-10-25

## (undated) RX ORDER — FENTANYL CITRATE 50 UG/ML
INJECTION, SOLUTION INTRAMUSCULAR; INTRAVENOUS
Status: DISPENSED
Start: 2023-10-25